# Patient Record
Sex: FEMALE | Race: BLACK OR AFRICAN AMERICAN | NOT HISPANIC OR LATINO | URBAN - METROPOLITAN AREA
[De-identification: names, ages, dates, MRNs, and addresses within clinical notes are randomized per-mention and may not be internally consistent; named-entity substitution may affect disease eponyms.]

---

## 2020-12-03 ENCOUNTER — INPATIENT (INPATIENT)
Facility: HOSPITAL | Age: 65
LOS: 6 days | Discharge: HOSPICE | End: 2020-12-10
Attending: INTERNAL MEDICINE | Admitting: INTERNAL MEDICINE
Payer: COMMERCIAL

## 2020-12-03 VITALS
DIASTOLIC BLOOD PRESSURE: 81 MMHG | HEART RATE: 143 BPM | TEMPERATURE: 98 F | SYSTOLIC BLOOD PRESSURE: 115 MMHG | OXYGEN SATURATION: 93 % | RESPIRATION RATE: 18 BRPM

## 2020-12-03 DIAGNOSIS — C79.51 SECONDARY MALIGNANT NEOPLASM OF BONE: ICD-10-CM

## 2020-12-03 DIAGNOSIS — Z66 DO NOT RESUSCITATE: ICD-10-CM

## 2020-12-03 DIAGNOSIS — E87.5 HYPERKALEMIA: ICD-10-CM

## 2020-12-03 DIAGNOSIS — C79.52 SECONDARY MALIGNANT NEOPLASM OF BONE MARROW: ICD-10-CM

## 2020-12-03 DIAGNOSIS — R10.9 UNSPECIFIED ABDOMINAL PAIN: ICD-10-CM

## 2020-12-03 DIAGNOSIS — I10 ESSENTIAL (PRIMARY) HYPERTENSION: ICD-10-CM

## 2020-12-03 DIAGNOSIS — Z88.0 ALLERGY STATUS TO PENICILLIN: ICD-10-CM

## 2020-12-03 DIAGNOSIS — Z87.891 PERSONAL HISTORY OF NICOTINE DEPENDENCE: ICD-10-CM

## 2020-12-03 DIAGNOSIS — J43.9 EMPHYSEMA, UNSPECIFIED: ICD-10-CM

## 2020-12-03 DIAGNOSIS — C34.12 MALIGNANT NEOPLASM OF UPPER LOBE, LEFT BRONCHUS OR LUNG: ICD-10-CM

## 2020-12-03 DIAGNOSIS — F40.00 AGORAPHOBIA, UNSPECIFIED: ICD-10-CM

## 2020-12-03 DIAGNOSIS — M84.58XA PATHOLOGICAL FRACTURE IN NEOPLASTIC DISEASE, OTHER SPECIFIED SITE, INITIAL ENCOUNTER FOR FRACTURE: ICD-10-CM

## 2020-12-03 DIAGNOSIS — C79.89 SECONDARY MALIGNANT NEOPLASM OF OTHER SPECIFIED SITES: ICD-10-CM

## 2020-12-03 DIAGNOSIS — F41.8 OTHER SPECIFIED ANXIETY DISORDERS: ICD-10-CM

## 2020-12-03 DIAGNOSIS — E43 UNSPECIFIED SEVERE PROTEIN-CALORIE MALNUTRITION: ICD-10-CM

## 2020-12-03 LAB
ALBUMIN SERPL ELPH-MCNC: 3.7 G/DL — SIGNIFICANT CHANGE UP (ref 3.5–5.2)
ALP SERPL-CCNC: 112 U/L — SIGNIFICANT CHANGE UP (ref 30–115)
ALT FLD-CCNC: 11 U/L — SIGNIFICANT CHANGE UP (ref 0–41)
ANION GAP SERPL CALC-SCNC: 16 MMOL/L — HIGH (ref 7–14)
AST SERPL-CCNC: 24 U/L — SIGNIFICANT CHANGE UP (ref 0–41)
BASE EXCESS BLDV CALC-SCNC: 5.7 MMOL/L — HIGH (ref -2–2)
BASOPHILS # BLD AUTO: 0.05 K/UL — SIGNIFICANT CHANGE UP (ref 0–0.2)
BASOPHILS NFR BLD AUTO: 0.6 % — SIGNIFICANT CHANGE UP (ref 0–1)
BILIRUB SERPL-MCNC: 0.2 MG/DL — SIGNIFICANT CHANGE UP (ref 0.2–1.2)
BUN SERPL-MCNC: 9 MG/DL — LOW (ref 10–20)
CA-I SERPL-SCNC: 1.15 MMOL/L — SIGNIFICANT CHANGE UP (ref 1.12–1.3)
CALCIUM SERPL-MCNC: 9 MG/DL — SIGNIFICANT CHANGE UP (ref 8.5–10.1)
CHLORIDE SERPL-SCNC: 94 MMOL/L — LOW (ref 98–110)
CO2 SERPL-SCNC: 25 MMOL/L — SIGNIFICANT CHANGE UP (ref 17–32)
CREAT SERPL-MCNC: 0.5 MG/DL — LOW (ref 0.7–1.5)
D DIMER BLD IA.RAPID-MCNC: 3633 NG/ML DDU — HIGH (ref 0–230)
EOSINOPHIL # BLD AUTO: 0.01 K/UL — SIGNIFICANT CHANGE UP (ref 0–0.7)
EOSINOPHIL NFR BLD AUTO: 0.1 % — SIGNIFICANT CHANGE UP (ref 0–8)
GAS PNL BLDV: 139 MMOL/L — SIGNIFICANT CHANGE UP (ref 136–145)
GAS PNL BLDV: SIGNIFICANT CHANGE UP
GLUCOSE SERPL-MCNC: 119 MG/DL — HIGH (ref 70–99)
HCO3 BLDV-SCNC: 33 MMOL/L — HIGH (ref 22–29)
HCT VFR BLD CALC: 36.5 % — LOW (ref 37–47)
HCT VFR BLDA CALC: 35.3 % — SIGNIFICANT CHANGE UP (ref 34–44)
HGB BLD CALC-MCNC: 11.5 G/DL — LOW (ref 14–18)
HGB BLD-MCNC: 11.3 G/DL — LOW (ref 12–16)
IMM GRANULOCYTES NFR BLD AUTO: 0.5 % — HIGH (ref 0.1–0.3)
LACTATE BLDV-MCNC: 2.5 MMOL/L — HIGH (ref 0.5–1.6)
LACTATE SERPL-SCNC: 2.5 MMOL/L — HIGH (ref 0.7–2)
LIDOCAIN IGE QN: 23 U/L — SIGNIFICANT CHANGE UP (ref 7–60)
LYMPHOCYTES # BLD AUTO: 1.61 K/UL — SIGNIFICANT CHANGE UP (ref 1.2–3.4)
LYMPHOCYTES # BLD AUTO: 18.5 % — LOW (ref 20.5–51.1)
MAGNESIUM SERPL-MCNC: 1.8 MG/DL — SIGNIFICANT CHANGE UP (ref 1.8–2.4)
MCHC RBC-ENTMCNC: 30.9 PG — SIGNIFICANT CHANGE UP (ref 27–31)
MCHC RBC-ENTMCNC: 31 G/DL — LOW (ref 32–37)
MCV RBC AUTO: 99.7 FL — HIGH (ref 81–99)
MONOCYTES # BLD AUTO: 0.92 K/UL — HIGH (ref 0.1–0.6)
MONOCYTES NFR BLD AUTO: 10.6 % — HIGH (ref 1.7–9.3)
NEUTROPHILS # BLD AUTO: 6.07 K/UL — SIGNIFICANT CHANGE UP (ref 1.4–6.5)
NEUTROPHILS NFR BLD AUTO: 69.7 % — SIGNIFICANT CHANGE UP (ref 42.2–75.2)
NRBC # BLD: 0 /100 WBCS — SIGNIFICANT CHANGE UP (ref 0–0)
NT-PROBNP SERPL-SCNC: 223 PG/ML — SIGNIFICANT CHANGE UP (ref 0–300)
PCO2 BLDV: 62 MMHG — HIGH (ref 41–51)
PH BLDV: 7.33 — SIGNIFICANT CHANGE UP (ref 7.26–7.43)
PHOSPHATE SERPL-MCNC: 3.3 MG/DL — SIGNIFICANT CHANGE UP (ref 2.1–4.9)
PLATELET # BLD AUTO: 463 K/UL — HIGH (ref 130–400)
PO2 BLDV: 40 MMHG — SIGNIFICANT CHANGE UP (ref 20–40)
POTASSIUM BLDV-SCNC: 3.7 MMOL/L — SIGNIFICANT CHANGE UP (ref 3.3–5.6)
POTASSIUM SERPL-MCNC: 4.7 MMOL/L — SIGNIFICANT CHANGE UP (ref 3.5–5)
POTASSIUM SERPL-SCNC: 4.7 MMOL/L — SIGNIFICANT CHANGE UP (ref 3.5–5)
PROT SERPL-MCNC: 7 G/DL — SIGNIFICANT CHANGE UP (ref 6–8)
RAPID RVP RESULT: SIGNIFICANT CHANGE UP
RBC # BLD: 3.66 M/UL — LOW (ref 4.2–5.4)
RBC # FLD: 14.3 % — SIGNIFICANT CHANGE UP (ref 11.5–14.5)
SAO2 % BLDV: 67 % — SIGNIFICANT CHANGE UP
SARS-COV-2 RNA SPEC QL NAA+PROBE: SIGNIFICANT CHANGE UP
SODIUM SERPL-SCNC: 135 MMOL/L — SIGNIFICANT CHANGE UP (ref 135–146)
TROPONIN T SERPL-MCNC: <0.01 NG/ML — SIGNIFICANT CHANGE UP
WBC # BLD: 8.7 K/UL — SIGNIFICANT CHANGE UP (ref 4.8–10.8)
WBC # FLD AUTO: 8.7 K/UL — SIGNIFICANT CHANGE UP (ref 4.8–10.8)

## 2020-12-03 PROCEDURE — 93010 ELECTROCARDIOGRAM REPORT: CPT

## 2020-12-03 PROCEDURE — 70450 CT HEAD/BRAIN W/O DYE: CPT | Mod: 26

## 2020-12-03 PROCEDURE — 99285 EMERGENCY DEPT VISIT HI MDM: CPT

## 2020-12-03 PROCEDURE — 71045 X-RAY EXAM CHEST 1 VIEW: CPT | Mod: 26

## 2020-12-03 PROCEDURE — 74177 CT ABD & PELVIS W/CONTRAST: CPT | Mod: 26

## 2020-12-03 PROCEDURE — 71275 CT ANGIOGRAPHY CHEST: CPT | Mod: 26

## 2020-12-03 RX ORDER — CEFTRIAXONE 500 MG/1
1000 INJECTION, POWDER, FOR SOLUTION INTRAMUSCULAR; INTRAVENOUS ONCE
Refills: 0 | Status: COMPLETED | OUTPATIENT
Start: 2020-12-03 | End: 2020-12-03

## 2020-12-03 RX ORDER — AZITHROMYCIN 500 MG/1
500 TABLET, FILM COATED ORAL ONCE
Refills: 0 | Status: COMPLETED | OUTPATIENT
Start: 2020-12-03 | End: 2020-12-03

## 2020-12-03 RX ORDER — SODIUM CHLORIDE 9 MG/ML
1000 INJECTION, SOLUTION INTRAVENOUS ONCE
Refills: 0 | Status: DISCONTINUED | OUTPATIENT
Start: 2020-12-03 | End: 2020-12-03

## 2020-12-03 RX ORDER — IPRATROPIUM/ALBUTEROL SULFATE 18-103MCG
3 AEROSOL WITH ADAPTER (GRAM) INHALATION
Refills: 0 | Status: COMPLETED | OUTPATIENT
Start: 2020-12-03 | End: 2020-12-03

## 2020-12-03 RX ORDER — MAGNESIUM SULFATE 500 MG/ML
2 VIAL (ML) INJECTION ONCE
Refills: 0 | Status: COMPLETED | OUTPATIENT
Start: 2020-12-03 | End: 2020-12-03

## 2020-12-03 RX ORDER — DEXAMETHASONE 0.5 MG/5ML
10 ELIXIR ORAL ONCE
Refills: 0 | Status: COMPLETED | OUTPATIENT
Start: 2020-12-03 | End: 2020-12-03

## 2020-12-03 RX ORDER — SODIUM CHLORIDE 9 MG/ML
1000 INJECTION INTRAMUSCULAR; INTRAVENOUS; SUBCUTANEOUS ONCE
Refills: 0 | Status: COMPLETED | OUTPATIENT
Start: 2020-12-03 | End: 2020-12-03

## 2020-12-03 RX ORDER — FOLIC ACID 0.8 MG
1 TABLET ORAL ONCE
Refills: 0 | Status: COMPLETED | OUTPATIENT
Start: 2020-12-03 | End: 2020-12-04

## 2020-12-03 RX ORDER — THIAMINE MONONITRATE (VIT B1) 100 MG
100 TABLET ORAL ONCE
Refills: 0 | Status: COMPLETED | OUTPATIENT
Start: 2020-12-03 | End: 2020-12-03

## 2020-12-03 RX ADMIN — Medication 3 MILLILITER(S): at 23:20

## 2020-12-03 RX ADMIN — Medication 50 GRAM(S): at 23:22

## 2020-12-03 RX ADMIN — Medication 3 MILLILITER(S): at 23:10

## 2020-12-03 RX ADMIN — Medication 3 MILLILITER(S): at 23:00

## 2020-12-03 RX ADMIN — CEFTRIAXONE 100 MILLIGRAM(S): 500 INJECTION, POWDER, FOR SOLUTION INTRAMUSCULAR; INTRAVENOUS at 23:22

## 2020-12-03 RX ADMIN — AZITHROMYCIN 255 MILLIGRAM(S): 500 TABLET, FILM COATED ORAL at 23:23

## 2020-12-03 RX ADMIN — SODIUM CHLORIDE 1000 MILLILITER(S): 9 INJECTION INTRAMUSCULAR; INTRAVENOUS; SUBCUTANEOUS at 23:22

## 2020-12-03 RX ADMIN — Medication 10 MILLIGRAM(S): at 23:18

## 2020-12-03 RX ADMIN — Medication 100 MILLIGRAM(S): at 23:18

## 2020-12-03 NOTE — ED PROVIDER NOTE - OBJECTIVE STATEMENT
65 y.o female w/ hx of asthma, COPD, anxiety presents to the ED for evaluation of abd pain x 3 months.  Periumbilical, intermittent, crampy, mild severity, no radiation of pain.  reports 30 lb weight loss over past 3 months.  Also reports THOMAS for past few weeks, worse today. Last BM yesterday. Denies diarrhea, constipation, vaginal d/c or bleeding, urinary sxs, fever, chills, back pain

## 2020-12-03 NOTE — ED PROVIDER NOTE - CARE PLAN
Principal Discharge DX:	Respiratory distress  Secondary Diagnosis:	Hypoxia  Secondary Diagnosis:	Liver mass   Principal Discharge DX:	Respiratory distress  Secondary Diagnosis:	Hypoxia  Secondary Diagnosis:	Liver mass  Secondary Diagnosis:	Pathologic fracture

## 2020-12-03 NOTE — ED PROVIDER NOTE - PHYSICAL EXAMINATION
CONST: chronically ill appearing.   EYES: PERRL, EOMI, Sclera and conjunctiva clear.  NECK: Non-tender, no meningeal signs, supple  CARD: tachycardic, regular.   RESP: spo2 75% on RA after exertion, spo2 98% on 8 L nonrebreather.   GI: Soft, diffusely tender, mildly distended.   MS: Normal ROM in all extremities. No edema of lower extremities, no calf pain, radial pulses 2+ bilaterally  SKIN: Warm, dry, no acute rashes. Good turgor  NEURO: A&Ox3, No focal deficits. Strength 5/5 with no sensory deficits. Steady gait

## 2020-12-03 NOTE — ED PROVIDER NOTE - NS ED ROS FT
Constitutional: See HPI.  Eyes: No visual changes, eye pain or discharge. No Photophobia  ENMT: No hearing changes, pain, discharge or infections.   Cardiac: No SOB or edema. No chest pain with exertion.  Respiratory: + dyspnea. No cough or respiratory distress. No hemoptysis.   GI: No nausea, vomiting, diarrhea or abdominal pain.  : No dysuria, frequency or burning. No Discharge  MS: No myalgia, muscle weakness, joint pain or back pain.  Neuro: No headache or weakness  Skin: No skin rash.  Except as documented in the HPI, all other systems are negative.

## 2020-12-03 NOTE — ED PROVIDER NOTE - ATTENDING CONTRIBUTION TO CARE
I personally evaluated the patient. I reviewed the Resident’s or Physician Assistant’s note (as assigned above), and agree with the findings and plan except as documented in my note.    65 y.o female w/ hx of asthma, COPD, anxiety presents to the ED for evaluation of abd pain x 3 months.  Reports weight loss and SOB.    CONSTITUTIONAL: Cachectic   SKIN: skin exam is warm and dry, no acute rash.  HEAD: Normocephalic; atraumatic.  EYES: PERRL, 3 mm bilateral, no nystagmus, EOM intact; conjunctiva and sclera clear.  ENT: No nasal discharge; airway clear.  NECK: Supple; non tender.+ full passive ROM in all directions. No JVD  CARD: S1, S2 normal; no murmurs, gallops, or rubs. Regular rate and rhythm. + Symmetric Strong Pulses  RESP: tachypneic, decreased breath sounds b/l   ABD: tender diffusely   EXT: Normal ROM. No clubbing, cyanosis or edema. Dp and Pt Pulses intact. Cap refill less than 3 seconds  NEURO: CN 2-12 intact, normal finger to nose, Strength intact b/l    A/p- CT chest/abd, labs, Supplemental O2, IVF, reassess

## 2020-12-03 NOTE — ED ADULT NURSE NOTE - NSIMPLEMENTINTERV_GEN_ALL_ED
Implemented All Fall Risk Interventions:  [place] to call system. Call bell, personal items and telephone within reach. Instruct patient to call for assistance. Room bathroom lighting operational. Non-slip footwear when patient is off stretcher. Physically safe environment: no spills, clutter or unnecessary equipment. Stretcher in lowest position, wheels locked, appropriate side rails in place. Provide visual cue, wrist band, yellow gown, etc. Monitor gait and stability. Monitor for mental status changes and reorient to person, place, and time. Review medications for side effects contributing to fall risk. Reinforce activity limits and safety measures with patient and family.

## 2020-12-03 NOTE — ED PROVIDER NOTE - PROGRESS NOTE DETAILS
ct reading noted.  nebs given w/ some improvement, hr improved w/ fluids, abx given for potential infectious source.  spo2 98% on 5L NC,  before 75% on RA, 80s on 5L NC.  still mildly tachypenic refusing bipap 2/2 anxiety/ claustrophobia.  discussed case w/ icu jamie jennings. recommends step down approved by marleny\ discussed case w/ NS.  aware of consult Discussed case w/ mar.  aware of admission. pt received 600 cc LR before switching to NS for abx compatibility.

## 2020-12-03 NOTE — ED ADULT TRIAGE NOTE - CHIEF COMPLAINT QUOTE
Pt presenting with abdominal pain x 3 months, pt also reports a 30lb weight loss since May, pt feels anxious in triage so ekg was done because of tachycardia

## 2020-12-03 NOTE — CONSULT NOTE ADULT - SUBJECTIVE AND OBJECTIVE BOX
HISTORY OF PRESENT ILLNESS: 65y Female hx of asthma, COPD, anxiety presents to the ED for evaluation of intermittent, crampy periumbilical abdominal pain x 3 months that does not radiate as well as a 30 lb weight loss over the past 3 months.  The patoient also admits to THOMAS for past few weeks, worsening constipation x 2 months, difficulty ambulating due to weakness and she fell out of her bed 3 weeks ago. Pt denies groin / saddle anesthesia, diarrhea,  vaginal d/c or bleeding,  fever, chills, or paresthesias or numbness in her extremities. Patient admits to "a few" episodes of urinary incontinence but states that it was because she could not make it to the bathroom in time but always feels the urge if she has to go. While in the ED the patient reports she was able to ambulate out of her bed to the bathroom but felt weak and short of breath.     PAST MEDICAL & SURGICAL HISTORY:  Agoraphobia    Anxiety    Emphysema lung    FAMILY HISTORY:    Allergies    penicillins (Unknown)    Intolerances    REVIEW OF SYSTEMS : All systems negative other than those listed in HPI  General:	-  Skin/Breast: -  Ophthalmologic: -   ENMT: -  Respiratory and Thorax: -  Cardiovascular: -	  Gastrointestinal: -  Genitourinary:-  Musculoskeletal:	-  Neurological:	-  Psychiatric: -  Hematology/Lymphatics:	-  Endocrine:-  Allergic/Immunologic:	-    Vital Signs Last 24 Hrs  T(C): 36.9 (03 Dec 2020 17:26), Max: 36.9 (03 Dec 2020 17:26)  T(F): 98.4 (03 Dec 2020 17:26), Max: 98.4 (03 Dec 2020 17:26)  HR: 129 (03 Dec 2020 22:10) (98 - 143)  BP: 122/69 (03 Dec 2020 22:10) (95/64 - 122/69)  BP(mean): --  RR: 18 (03 Dec 2020 22:14) (16 - 20)  SpO2: 94% (03 Dec 2020 22:14) (79% - 97%)    Physical Exam :  General : Patient appears malnourished with generalized weakness throughout   A&O x 3   Tongue midline  Facial features symmetric, No droop  Speech clear and appropriate, no slur   Pt speaking in full sentences   Follows all commands   Occular :   PERRLA, EOMI  Motor :   MAEx4 b/l  Generalized weakness throughout   5/5 in b/u UE good hand , 5/5 biceps & delts   No spinal point tenderness to palpation   4-/5 b/l LE leg raise   5/5 dorsiflexion & plantarflexion bilaterally   Bends knees b/l producing hip pain per pt   Sensory :  Intact bilaterally to light touch in upper and lower extrem     Hoffmans : negative b/l     LABS:                        11.3   8.70  )-----------( 463      ( 03 Dec 2020 18:56 )             36.5     12-03    135  |  94<L>  |  9<L>  ----------------------------<  119<H>  4.7   |  25  |  0.5<L>    Ca    9.0      03 Dec 2020 18:56  Phos  3.3     12-03  Mg     1.8     12-03    TPro  7.0  /  Alb  3.7  /  TBili  0.2  /  DBili  x   /  AST  24  /  ALT  11  /  AlkPhos  112  12-03    RADIOLOGY & ADDITIONAL STUDIES:  < from: CT Abdomen and Pelvis w/ IV Cont (12.03.20 @ 21:27) >    IMPRESSION:    Multiple left upper lobe pulmonary masses with conglomerate mediastinal/hilar lymphadenopathy and numerous hepatic lesions. Findings concerning for metastatic malignancy.    Radiolucent lesion in the inferior endplate of L2 is indeterminate.    No CT evidence of acute pulmonary embolus.    Additional Findings/Recommendations After Attending Radiologist Review:  Agree no evidence of pulmonary embolism. Multiple left upper lobe lesions suspicious for malignancy. Also scattered throughout the left lung are micronodules which could represent superimposed infection or lymphangitic spread. There are multiple findings of bony metastatic disease. Left iliac bone lytic lesion with a 6.3 cm soft tissue component (series 8, image 187). This process anteriorly displaces the retroperitoneum and left kidney. Acute pathologic compression fracture deformity of the left aspect of L5 vertebral body, with mild endplate depression. Metastatic lytic lesion in L2 vertebral body, also the right L4 posterior elements. Indeterminate 1.8 cm hyperenhancing nodule in the pelvis, may be associated with the mesentery (series 8, image 270). Possible periportal lymphadenopathy. Please note the overnight ER examination is setting is not tailored for detailed oncologic evaluation/staging or full characterization of metastatic disease.    Spoke with ROBERTA WEAVER PA on 12/3/2020 10:36 PM with readback.    SUZANNA IBARRA M.D., RESIDENT RADIOLOGIST  This document has been electronically signed.  MEGHAN HERNANDEZ MD; Attending Radiologist  This document has been electronically signed. Dec  3 2020 10:37PM    < end of copied text >    < from: CT Head No Cont (12.03.20 @ 21:40) >  Impression:    1.3 cm lytic lesion within the posterior left parietal bone suspicious for metastatic disease.    No acute intracranial hemorrhage, significant displacing mass effect, or midline shift is noted. Please note that parenchymal metastatic disease cannot be excluded on noncontrast head CT and if there is persistent clinical concern a postcontrast MRI should be obtained.    MEGHAN HERNANDEZ MD; Attending Radiologist  This document has been electronically signed. Dec  3 2020  9:57PM    < end of copied text >    Assessment / Plan : 65y F presents to ED with 3m of abdominal pain infrequent bowl movements and 30lbs weight loss x3m. CT abd shows multiple pulmonary lesions suspicious for malignancy. Multiple findings of bony metastatic disease including the Left iliac bone with a 6.3 cm soft tissue component that displaces the retroperitoneum and left kidney. Acute pathologic compression fracture of the  left L5 vertebral body, with mild endplate depression. Metastatic lytic lesion in L2 vertebral body, also the right L4 posterior elements. Indeterminate 1.8 cm hyperenhancing nodule in the pelvis, may be associated with the mesentery.   - on exam pt MAEx4, has generalized weakness throughout, no tenderness to palpation of the spine, denies saddle anesthesia, no paresthesias to the extremities and reports that she feels the urge to urinate and move her bowels but has experienced "accidents" because she is too weak to ambulate.   - Heme / Onc work up of primary CA  - MRI w/ w/o of lumbar spine / pelvis when able   - Will discuss with attending, no acute neurosurgical intervention at this time      HISTORY OF PRESENT ILLNESS: 65y Female hx of asthma, COPD, anxiety presents to the ED for evaluation of intermittent, crampy periumbilical abdominal pain x 3 months that does not radiate as well as a 30 lb weight loss over the past 3 months.  The patoient also admits to THOMAS for past few weeks, worsening constipation x 2 months, difficulty ambulating due to weakness and she fell out of her bed 3 weeks ago. Pt denies groin / saddle anesthesia, diarrhea,  vaginal d/c or bleeding,  fever, chills, or paresthesias or numbness in her extremities. Patient admits to "a few" episodes of urinary incontinence but states that it was because she could not make it to the bathroom in time but always feels the urge if she has to go. While in the ED the patient reports she was able to ambulate out of her bed to the bathroom but felt weak and short of breath.     PAST MEDICAL & SURGICAL HISTORY:  Agoraphobia    Anxiety    Emphysema lung    FAMILY HISTORY:    Allergies    penicillins (Unknown)    Intolerances    REVIEW OF SYSTEMS : All systems negative other than those listed in HPI  General:	-  Skin/Breast: -  Ophthalmologic: -   ENMT: -  Respiratory and Thorax: -  Cardiovascular: -	  Gastrointestinal: -  Genitourinary:-  Musculoskeletal:	-  Neurological:	-  Psychiatric: -  Hematology/Lymphatics:	-  Endocrine:-  Allergic/Immunologic:	-    Vital Signs Last 24 Hrs  T(C): 36.9 (03 Dec 2020 17:26), Max: 36.9 (03 Dec 2020 17:26)  T(F): 98.4 (03 Dec 2020 17:26), Max: 98.4 (03 Dec 2020 17:26)  HR: 129 (03 Dec 2020 22:10) (98 - 143)  BP: 122/69 (03 Dec 2020 22:10) (95/64 - 122/69)  BP(mean): --  RR: 18 (03 Dec 2020 22:14) (16 - 20)  SpO2: 94% (03 Dec 2020 22:14) (79% - 97%)    Physical Exam :  General : Patient appears malnourished with generalized weakness throughout   A&O x 3   Tongue midline  Facial features symmetric, No droop  Speech clear and appropriate, no slur   Pt speaking in full sentences   Follows all commands   Occular :   PERRLA, EOMI  Motor :   MAEx4 b/l  Generalized weakness throughout   5/5 in b/u UE good hand , 5/5 biceps & delts   No spinal point tenderness to palpation   4-/5 b/l LE leg raise   5/5 dorsiflexion & plantarflexion bilaterally   Bends knees b/l producing hip pain per pt   Sensory :  Intact bilaterally to light touch in upper and lower extrem     Hoffmans : negative b/l     LABS:                        11.3   8.70  )-----------( 463      ( 03 Dec 2020 18:56 )             36.5     12-03    135  |  94<L>  |  9<L>  ----------------------------<  119<H>  4.7   |  25  |  0.5<L>    Ca    9.0      03 Dec 2020 18:56  Phos  3.3     12-03  Mg     1.8     12-03    TPro  7.0  /  Alb  3.7  /  TBili  0.2  /  DBili  x   /  AST  24  /  ALT  11  /  AlkPhos  112  12-03    RADIOLOGY & ADDITIONAL STUDIES:  < from: CT Abdomen and Pelvis w/ IV Cont (12.03.20 @ 21:27) >    IMPRESSION:    Multiple left upper lobe pulmonary masses with conglomerate mediastinal/hilar lymphadenopathy and numerous hepatic lesions. Findings concerning for metastatic malignancy.    Radiolucent lesion in the inferior endplate of L2 is indeterminate.    No CT evidence of acute pulmonary embolus.    Additional Findings/Recommendations After Attending Radiologist Review:  Agree no evidence of pulmonary embolism. Multiple left upper lobe lesions suspicious for malignancy. Also scattered throughout the left lung are micronodules which could represent superimposed infection or lymphangitic spread. There are multiple findings of bony metastatic disease. Left iliac bone lytic lesion with a 6.3 cm soft tissue component (series 8, image 187). This process anteriorly displaces the retroperitoneum and left kidney. Acute pathologic compression fracture deformity of the left aspect of L5 vertebral body, with mild endplate depression. Metastatic lytic lesion in L2 vertebral body, also the right L4 posterior elements. Indeterminate 1.8 cm hyperenhancing nodule in the pelvis, may be associated with the mesentery (series 8, image 270). Possible periportal lymphadenopathy. Please note the overnight ER examination is setting is not tailored for detailed oncologic evaluation/staging or full characterization of metastatic disease.    Spoke with ROBERTA WEAVER PA on 12/3/2020 10:36 PM with readback.    SUZANNA IBARRA M.D., RESIDENT RADIOLOGIST  This document has been electronically signed.  MEGHAN HERNANDEZ MD; Attending Radiologist  This document has been electronically signed. Dec  3 2020 10:37PM    < end of copied text >    < from: CT Head No Cont (12.03.20 @ 21:40) >  Impression:    1.3 cm lytic lesion within the posterior left parietal bone suspicious for metastatic disease.    No acute intracranial hemorrhage, significant displacing mass effect, or midline shift is noted. Please note that parenchymal metastatic disease cannot be excluded on noncontrast head CT and if there is persistent clinical concern a postcontrast MRI should be obtained.    MEGHAN HERNANDEZ MD; Attending Radiologist  This document has been electronically signed. Dec  3 2020  9:57PM    < end of copied text >    Assessment / Plan : 65y F presents to ED with 3m of abdominal pain infrequent bowl movements and 30lbs weight loss x3m. CT abd shows multiple pulmonary lesions suspicious for malignancy. Multiple findings of bony metastatic disease including the Left iliac bone with a 6.3 cm soft tissue component that displaces the retroperitoneum and left kidney. Acute pathologic compression fracture of the  left L5 vertebral body, with mild endplate depression. Metastatic lytic lesion in L2 vertebral body, also the right L4 posterior elements. Indeterminate 1.8 cm hyperenhancing nodule in the pelvis, may be associated with the mesentery.   - on exam pt MAEx4, has generalized weakness throughout, no tenderness to palpation of the spine, denies saddle anesthesia, no paresthesias to the extremities and reports that she feels the urge to urinate and move her bowels but has experienced "accidents" because she is too weak to ambulate.   - Heme / Onc work up of primary CA  - MRI w/ w/o of lumbar spine / pelvis when able   - Will discuss with attending, no urgent acute neurosurgical intervention at this time

## 2020-12-04 DIAGNOSIS — F40.01 AGORAPHOBIA WITH PANIC DISORDER: ICD-10-CM

## 2020-12-04 LAB
ALBUMIN SERPL ELPH-MCNC: 3.7 G/DL — SIGNIFICANT CHANGE UP (ref 3.5–5.2)
ALP SERPL-CCNC: 112 U/L — SIGNIFICANT CHANGE UP (ref 30–115)
ALT FLD-CCNC: 11 U/L — SIGNIFICANT CHANGE UP (ref 0–41)
ANION GAP SERPL CALC-SCNC: 12 MMOL/L — SIGNIFICANT CHANGE UP (ref 7–14)
APPEARANCE UR: CLEAR — SIGNIFICANT CHANGE UP
AST SERPL-CCNC: 25 U/L — SIGNIFICANT CHANGE UP (ref 0–41)
BASOPHILS # BLD AUTO: 0.01 K/UL — SIGNIFICANT CHANGE UP (ref 0–0.2)
BASOPHILS NFR BLD AUTO: 0.2 % — SIGNIFICANT CHANGE UP (ref 0–1)
BILIRUB SERPL-MCNC: <0.2 MG/DL — SIGNIFICANT CHANGE UP (ref 0.2–1.2)
BILIRUB UR-MCNC: NEGATIVE — SIGNIFICANT CHANGE UP
BUN SERPL-MCNC: 7 MG/DL — LOW (ref 10–20)
CALCIUM SERPL-MCNC: 9.2 MG/DL — SIGNIFICANT CHANGE UP (ref 8.5–10.1)
CHLORIDE SERPL-SCNC: 98 MMOL/L — SIGNIFICANT CHANGE UP (ref 98–110)
CHOLEST SERPL-MCNC: 239 MG/DL — HIGH
CO2 SERPL-SCNC: 27 MMOL/L — SIGNIFICANT CHANGE UP (ref 17–32)
COLOR SPEC: SIGNIFICANT CHANGE UP
CREAT SERPL-MCNC: 0.5 MG/DL — LOW (ref 0.7–1.5)
CRP SERPL-MCNC: 1.3 MG/DL — HIGH (ref 0–0.4)
DIFF PNL FLD: NEGATIVE — SIGNIFICANT CHANGE UP
EOSINOPHIL # BLD AUTO: 0 K/UL — SIGNIFICANT CHANGE UP (ref 0–0.7)
EOSINOPHIL NFR BLD AUTO: 0 % — SIGNIFICANT CHANGE UP (ref 0–8)
FERRITIN SERPL-MCNC: 264 NG/ML — HIGH (ref 15–150)
FOLATE SERPL-MCNC: >20 NG/ML — SIGNIFICANT CHANGE UP
GLUCOSE SERPL-MCNC: 163 MG/DL — HIGH (ref 70–99)
GLUCOSE UR QL: NEGATIVE — SIGNIFICANT CHANGE UP
HCT VFR BLD CALC: 33.8 % — LOW (ref 37–47)
HCV AB S/CO SERPL IA: 0.03 COI — SIGNIFICANT CHANGE UP
HCV AB SERPL-IMP: SIGNIFICANT CHANGE UP
HDLC SERPL-MCNC: 83 MG/DL — SIGNIFICANT CHANGE UP
HGB BLD-MCNC: 10.3 G/DL — LOW (ref 12–16)
IMM GRANULOCYTES NFR BLD AUTO: 0.4 % — HIGH (ref 0.1–0.3)
KETONES UR-MCNC: SIGNIFICANT CHANGE UP
LEUKOCYTE ESTERASE UR-ACNC: NEGATIVE — SIGNIFICANT CHANGE UP
LIPID PNL WITH DIRECT LDL SERPL: 138 MG/DL — HIGH
LYMPHOCYTES # BLD AUTO: 0.31 K/UL — LOW (ref 1.2–3.4)
LYMPHOCYTES # BLD AUTO: 5.6 % — LOW (ref 20.5–51.1)
MAGNESIUM SERPL-MCNC: 2.2 MG/DL — SIGNIFICANT CHANGE UP (ref 1.8–2.4)
MCHC RBC-ENTMCNC: 30.5 G/DL — LOW (ref 32–37)
MCHC RBC-ENTMCNC: 31.5 PG — HIGH (ref 27–31)
MCV RBC AUTO: 103.4 FL — HIGH (ref 81–99)
MONOCYTES # BLD AUTO: 0.12 K/UL — SIGNIFICANT CHANGE UP (ref 0.1–0.6)
MONOCYTES NFR BLD AUTO: 2.2 % — SIGNIFICANT CHANGE UP (ref 1.7–9.3)
NEUTROPHILS # BLD AUTO: 5.07 K/UL — SIGNIFICANT CHANGE UP (ref 1.4–6.5)
NEUTROPHILS NFR BLD AUTO: 91.6 % — HIGH (ref 42.2–75.2)
NITRITE UR-MCNC: NEGATIVE — SIGNIFICANT CHANGE UP
NON HDL CHOLESTEROL: 156 MG/DL — HIGH
NRBC # BLD: 0 /100 WBCS — SIGNIFICANT CHANGE UP (ref 0–0)
PH UR: 6 — SIGNIFICANT CHANGE UP (ref 5–8)
PLATELET # BLD AUTO: 370 K/UL — SIGNIFICANT CHANGE UP (ref 130–400)
POTASSIUM SERPL-MCNC: 3.9 MMOL/L — SIGNIFICANT CHANGE UP (ref 3.5–5)
POTASSIUM SERPL-SCNC: 3.9 MMOL/L — SIGNIFICANT CHANGE UP (ref 3.5–5)
PROT SERPL-MCNC: 6.5 G/DL — SIGNIFICANT CHANGE UP (ref 6–8)
PROT UR-MCNC: SIGNIFICANT CHANGE UP
RBC # BLD: 3.27 M/UL — LOW (ref 4.2–5.4)
RBC # FLD: 14.4 % — SIGNIFICANT CHANGE UP (ref 11.5–14.5)
SODIUM SERPL-SCNC: 137 MMOL/L — SIGNIFICANT CHANGE UP (ref 135–146)
SP GR SPEC: >1.05 (ref 1.01–1.03)
TRIGL SERPL-MCNC: 86 MG/DL — SIGNIFICANT CHANGE UP
TSH SERPL-MCNC: 0.97 UIU/ML — SIGNIFICANT CHANGE UP (ref 0.27–4.2)
UROBILINOGEN FLD QL: SIGNIFICANT CHANGE UP
VIT B12 SERPL-MCNC: 1965 PG/ML — HIGH (ref 232–1245)
WBC # BLD: 5.53 K/UL — SIGNIFICANT CHANGE UP (ref 4.8–10.8)
WBC # FLD AUTO: 5.53 K/UL — SIGNIFICANT CHANGE UP (ref 4.8–10.8)

## 2020-12-04 PROCEDURE — 99253 IP/OBS CNSLTJ NEW/EST LOW 45: CPT | Mod: GC

## 2020-12-04 PROCEDURE — 99221 1ST HOSP IP/OBS SF/LOW 40: CPT

## 2020-12-04 PROCEDURE — 93306 TTE W/DOPPLER COMPLETE: CPT | Mod: 26

## 2020-12-04 RX ORDER — CHLORHEXIDINE GLUCONATE 213 G/1000ML
1 SOLUTION TOPICAL
Refills: 0 | Status: DISCONTINUED | OUTPATIENT
Start: 2020-12-04 | End: 2020-12-10

## 2020-12-04 RX ORDER — ALPRAZOLAM 0.25 MG
0.25 TABLET ORAL EVERY 6 HOURS
Refills: 0 | Status: DISCONTINUED | OUTPATIENT
Start: 2020-12-04 | End: 2020-12-10

## 2020-12-04 RX ORDER — MORPHINE SULFATE 50 MG/1
2 CAPSULE, EXTENDED RELEASE ORAL EVERY 4 HOURS
Refills: 0 | Status: DISCONTINUED | OUTPATIENT
Start: 2020-12-04 | End: 2020-12-07

## 2020-12-04 RX ORDER — DEXAMETHASONE 0.5 MG/5ML
4 ELIXIR ORAL EVERY 12 HOURS
Refills: 0 | Status: DISCONTINUED | OUTPATIENT
Start: 2020-12-04 | End: 2020-12-09

## 2020-12-04 RX ORDER — ENOXAPARIN SODIUM 100 MG/ML
40 INJECTION SUBCUTANEOUS DAILY
Refills: 0 | Status: DISCONTINUED | OUTPATIENT
Start: 2020-12-04 | End: 2020-12-10

## 2020-12-04 RX ORDER — ALPRAZOLAM 0.25 MG
0.25 TABLET ORAL EVERY 12 HOURS
Refills: 0 | Status: DISCONTINUED | OUTPATIENT
Start: 2020-12-04 | End: 2020-12-10

## 2020-12-04 RX ORDER — SERTRALINE 25 MG/1
125 TABLET, FILM COATED ORAL DAILY
Refills: 0 | Status: DISCONTINUED | OUTPATIENT
Start: 2020-12-04 | End: 2020-12-10

## 2020-12-04 RX ORDER — ALPRAZOLAM 0.25 MG
0.25 TABLET ORAL THREE TIMES A DAY
Refills: 0 | Status: DISCONTINUED | OUTPATIENT
Start: 2020-12-04 | End: 2020-12-04

## 2020-12-04 RX ORDER — METOPROLOL TARTRATE 50 MG
12.5 TABLET ORAL
Refills: 0 | Status: DISCONTINUED | OUTPATIENT
Start: 2020-12-04 | End: 2020-12-10

## 2020-12-04 RX ORDER — INFLUENZA VIRUS VACCINE 15; 15; 15; 15 UG/.5ML; UG/.5ML; UG/.5ML; UG/.5ML
0.5 SUSPENSION INTRAMUSCULAR ONCE
Refills: 0 | Status: DISCONTINUED | OUTPATIENT
Start: 2020-12-04 | End: 2020-12-10

## 2020-12-04 RX ORDER — SERTRALINE 25 MG/1
100 TABLET, FILM COATED ORAL DAILY
Refills: 0 | Status: DISCONTINUED | OUTPATIENT
Start: 2020-12-04 | End: 2020-12-04

## 2020-12-04 RX ORDER — AZITHROMYCIN 500 MG/1
500 TABLET, FILM COATED ORAL EVERY 24 HOURS
Refills: 0 | Status: DISCONTINUED | OUTPATIENT
Start: 2020-12-05 | End: 2020-12-08

## 2020-12-04 RX ORDER — CEFTRIAXONE 500 MG/1
1000 INJECTION, POWDER, FOR SOLUTION INTRAMUSCULAR; INTRAVENOUS EVERY 24 HOURS
Refills: 0 | Status: DISCONTINUED | OUTPATIENT
Start: 2020-12-04 | End: 2020-12-08

## 2020-12-04 RX ORDER — FLUTICASONE PROPIONATE AND SALMETEROL 50; 250 UG/1; UG/1
1 POWDER ORAL; RESPIRATORY (INHALATION)
Qty: 0 | Refills: 0 | DISCHARGE

## 2020-12-04 RX ORDER — AZITHROMYCIN 500 MG/1
500 TABLET, FILM COATED ORAL ONCE
Refills: 0 | Status: COMPLETED | OUTPATIENT
Start: 2020-12-04 | End: 2020-12-04

## 2020-12-04 RX ORDER — AZITHROMYCIN 500 MG/1
TABLET, FILM COATED ORAL
Refills: 0 | Status: DISCONTINUED | OUTPATIENT
Start: 2020-12-04 | End: 2020-12-08

## 2020-12-04 RX ORDER — BUDESONIDE AND FORMOTEROL FUMARATE DIHYDRATE 160; 4.5 UG/1; UG/1
2 AEROSOL RESPIRATORY (INHALATION)
Refills: 0 | Status: DISCONTINUED | OUTPATIENT
Start: 2020-12-04 | End: 2020-12-10

## 2020-12-04 RX ADMIN — SERTRALINE 100 MILLIGRAM(S): 25 TABLET, FILM COATED ORAL at 11:50

## 2020-12-04 RX ADMIN — Medication 12.5 MILLIGRAM(S): at 17:42

## 2020-12-04 RX ADMIN — Medication 4 MILLIGRAM(S): at 17:42

## 2020-12-04 RX ADMIN — Medication 1 MILLIGRAM(S): at 05:10

## 2020-12-04 RX ADMIN — AZITHROMYCIN 255 MILLIGRAM(S): 500 TABLET, FILM COATED ORAL at 09:20

## 2020-12-04 RX ADMIN — CHLORHEXIDINE GLUCONATE 1 APPLICATION(S): 213 SOLUTION TOPICAL at 05:09

## 2020-12-04 RX ADMIN — Medication 10 MILLIGRAM(S): at 05:09

## 2020-12-04 RX ADMIN — CEFTRIAXONE 100 MILLIGRAM(S): 500 INJECTION, POWDER, FOR SOLUTION INTRAMUSCULAR; INTRAVENOUS at 15:03

## 2020-12-04 RX ADMIN — MORPHINE SULFATE 2 MILLIGRAM(S): 50 CAPSULE, EXTENDED RELEASE ORAL at 22:57

## 2020-12-04 RX ADMIN — Medication 0.25 MILLIGRAM(S): at 19:45

## 2020-12-04 RX ADMIN — Medication 0.25 MILLIGRAM(S): at 14:35

## 2020-12-04 RX ADMIN — MORPHINE SULFATE 2 MILLIGRAM(S): 50 CAPSULE, EXTENDED RELEASE ORAL at 22:59

## 2020-12-04 RX ADMIN — BUDESONIDE AND FORMOTEROL FUMARATE DIHYDRATE 2 PUFF(S): 160; 4.5 AEROSOL RESPIRATORY (INHALATION) at 11:50

## 2020-12-04 RX ADMIN — ENOXAPARIN SODIUM 40 MILLIGRAM(S): 100 INJECTION SUBCUTANEOUS at 11:50

## 2020-12-04 RX ADMIN — Medication 0.25 MILLIGRAM(S): at 05:08

## 2020-12-04 NOTE — H&P ADULT - HISTORY OF PRESENT ILLNESS
64 y/o female with PMH Asthma, emphysema (not on home O2), anxiety/depression presents to [facility] with worsening abdominal pain. Patient states her pain first began about 3 months ago. It initially presented itself as a sharp intermittent pain behind the navel, but over time grew to be constant with radiation to her hips/back. During this time she has also become very weak, developed dyspnea on exertion, and lost about 30lbs unintentionally. Patient denies any fevers, chills, nausea, vomiting, palpitations, or numbness/tingling. She does endorse constipation. All other ROS negative.     In the ED, vitals were /81, , RR 18, T 98.4, SPO2 93% on RA. CT Angio negative for PE, but showed multiple left upper lobe lesions suspicious for malignancy. Also evidence of liver and bone metastatic disease. Admitted to medicine for further management.

## 2020-12-04 NOTE — CHART NOTE - COMMENTS:
Severe Protein-Calorie Malnutrition Indicators: 1) Severe muscle loss in the temporal/clavicle regions and subcutaneous fat loss in the buccal regions and fat overlying ribcage 2) <75% po intake >1 month    Underweight Indicator: BMI: 15.4 (64"/90#)

## 2020-12-04 NOTE — DIETITIAN INITIAL EVALUATION ADULT. - REASON FOR ADMISSION
Pt p/w worsening abdominal pain. CT Angio negative for PE, but showed multiple left upper lobe lesions suspicious for malignancy. Also evidence of liver and bone metastatic disease. Admitted to medicine for further management.

## 2020-12-04 NOTE — BEHAVIORAL HEALTH ASSESSMENT NOTE - HPI (INCLUDE ILLNESS QUALITY, SEVERITY, DURATION, TIMING, CONTEXT, MODIFYING FACTORS, ASSOCIATED SIGNS AND SYMPTOMS)
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 Psychiatry consulted for management of anxiety.       Upon evaluation, pt calm, cooperative, w/ bright affect, smiling throughout interview. States she's feeling relaxed at the moment as they had just given her xanax. States that last night was "worst night of her life" reporting that she had 6 panic attack since she's been in the hospital. States that she's suffered from anxiety and panic attack w/ agoraphobia and claustrophobia since 2013. States that the anxiety has become so severe that she doesn't leave her home. She also reports having fear of going to the doctors office and a fear of "needles." States that her health had significantly declined over the past year or two, stating that it has become so severe that she was no longer able to ambulate. Also reports having severe abdominal pain. States that her friend whom lives in  brought her to the hospital, due to concerns of the above. States that she knew "something was wrong" but is "glad I know what it is now." States that she is feeling better now that she is no longer in pain. In regards to her panic attacks, describes that as lasting 5-10 min, states they are triggered when she lays down and feels that she can not breath, states that she becomes hot, short of breath, and that she feels an impending sense of doom or death. States that the xanax helps, however is unsure if the Buspar does anything as she still gets attacks. States that her psychiatrist recently told her to increase the dose of her zoloft to 150mg. She otherwise denies SI, intent or plan. No AVH endorsed on exam. No manic or psychotic symptoms elicited on exam.       As per [facility] Reference #: [id number] patient currently prescribed xanax 0.25mg po TID by psychaitrist Dr. Amaya Davidson

## 2020-12-04 NOTE — BEHAVIORAL HEALTH ASSESSMENT NOTE - CASE SUMMARY
65 year old  Female with asthma, COPD, psychiatric history of anxiety and depression  in treatment on alprazolam  home dose 0.25mg po two times a day, sertraline 100m mg po daily and Buspar 10mg po bid, who presented to ED with severe constipation, weakness with ambulation, worsening abdominal pain and 30 lbs  wt loss, admitted for worsening abdominal pain found to have multiple metastatic masses in liver and bone of unknown primary origin.  Baseline underlying anxiety  with exacerbating symptoms in the context of medical conditions. At the time of the evaluation, notable mild improvement from reported exacerbation patient experienced in AM. She will strongly benefit from dose  titration  of xanax despite concern of delirium. Patient is severely anxious with multiple episodes of panic attack, while Klonopin appears to be a much more appropriate medication, but given the severity of the anxiety and the positive response to Xanax, changing it to Klonopin would not be appropriate at this time. Plan is to increase Xanax 0.25mg po three times a day to 0.25mg po four  times day (with prn doses of xanax 0.25mg po two times a day). Will also increase sertraline 100mg po daily to 125mg po daily. Unlikely patient is experiencing therapeutic effect from Bus par, therefore will Discontinue it. 65 year old  Female with asthma, COPD, psychiatric history of anxiety and depression  in treatment on alprazolam  home dose 0.25mg po two times a day, sertraline 100m mg po daily and Buspar 10mg po bid, who presented to ED with severe constipation, weakness with ambulation, worsening abdominal pain and 30 lbs  wt loss, admitted for worsening abdominal pain found to have multiple metastatic masses in liver and bone of unknown primary origin.  Baseline underlying anxiety  with exacerbating symptoms in the context of medical conditions. At the time of the evaluation, notable mild improvement from reported exacerbation patient experienced in AM. She will strongly benefit from dose  titration  of xanax despite concern of delirium. Patient is severely anxious with multiple episodes of panic attack, while Klonopin appears to be a much more appropriate medication, but given the severity of the anxiety and the positive response to Xanax, changing it to Klonopin would not be appropriate at this time. Plan is to increase Xanax 0.25mg po three times a day to 0.25mg po four  times day (with prn doses of xanax 0.25mg po two times a day). Will also increase sertraline 100mg po daily to 125mg po daily. Unlikely patient is experiencing therapeutic effect from Bus par, therefore will Discontinue it. She remains free of thought of self-harm.

## 2020-12-04 NOTE — DIETITIAN INITIAL EVALUATION ADULT. - ORAL INTAKE PTA/DIET HISTORY
Pt with decreased po intake x3 months 2/2 worsening abdominal pain and heartburn/gas. States that even when she didn't drink or eat, she would start having gas pain and feel as if something was stuck in her throat. Pt never had decreased appetite, but was just scared to eat d/t gas and abdominal pain. No cultural/Druze restrictions and NKFA. UBW ~74# in Jan 2020, now states that she's <50#, however ?accuracy. Upon observation pt seems to be >50#. Unable to verify accuracy of wt loss.

## 2020-12-04 NOTE — CONSULT NOTE ADULT - SUBJECTIVE AND OBJECTIVE BOX
Patient is a 65y old  Female who presents with a chief complaint of abdominal pain (04 Dec 2020 00:06)      HPI:  66 y/o female with PMH Asthma, emphysema (not on home O2), anxiety/depression presents to [facility] with worsening abdominal pain. Patient states her pain first began about 3 months ago. It initially presented itself as a sharp intermittent pain behind the navel, but over time grew to be constant with radiation to her hips/back. During this time she has also become very weak, developed dyspnea on exertion, and lost about 30lbs unintentionally. Patient denies any fevers, chills, nausea, vomiting, palpitations, or numbness/tingling. She does endorse constipation. All other ROS negative.     In the ED, vitals were /81, , RR 18, T 98.4, SPO2 93% on RA. CT Angio negative for PE, but showed multiple left upper lobe lesions suspicious for malignancy. Also evidence of liver and bone metastatic disease. Admitted to medicine for further management.  (04 Dec 2020 00:06)      PAST MEDICAL & SURGICAL HISTORY:  Depression    Agoraphobia    Anxiety    Emphysema lung    No significant past surgical history        SOCIAL HX:   Smoking   Positive                       ETOH                            Other    FAMILY HISTORY:  FH: stroke    FH: lung cancer  Mother; age 49    :  No known cardiovacular family hisotry     Review Of Systems:     All ROS are negative except per HPI       Allergies    penicillins (Unknown)    Intolerances          PHYSICAL EXAM    ICU Vital Signs Last 24 Hrs  T(C): 36.5 (04 Dec 2020 02:50), Max: 36.9 (03 Dec 2020 17:26)  T(F): 97.7 (04 Dec 2020 02:50), Max: 98.4 (03 Dec 2020 17:26)  HR: 119 (04 Dec 2020 02:50) (98 - 143)  BP: 100/60 (04 Dec 2020 02:50) (95/64 - 122/87)  BP(mean): --  ABP: --  ABP(mean): --  RR: 16 (04 Dec 2020 02:50) (16 - 20)  SpO2: 100% (03 Dec 2020 23:07) (79% - 100%)      CONSTITUTIONAL:  Ill appearing   NAD    ENT:   Airway patent,   Mouth with normal mucosa.   No thrush      CARDIAC:   Tachy   Regular rhythm.    No edema      Vascular:   normal systolic impulse  no bruits    RESPIRATORY:   No wheezing  Bilateral BS   Not tachypneic,  No use of accessory muscles    GASTROINTESTINAL:  Abdomen soft,   Non-tender,   No guarding,   + BS      NEUROLOGICAL:   Alert and oriented   No motor deficits.    SKIN:   Skin normal color for race,   No evidence of rash.      HEME LYMPH: .  No cervical  lymphadenopathy.  No inguinal lymphadenopathy              LABS:                          10.3   5.53  )-----------( 370      ( 04 Dec 2020 06:51 )             33.8                                               12-03    135  |  94<L>  |  9<L>  ----------------------------<  119<H>  4.7   |  25  |  0.5<L>    Ca    9.0      03 Dec 2020 18:56  Phos  3.3     12-03  Mg     1.8     12-03    TPro  7.0  /  Alb  3.7  /  TBili  0.2  /  DBili  x   /  AST  24  /  ALT  11  /  AlkPhos  112  12-03                                             Urinalysis Basic - ( 04 Dec 2020 00:45 )    Color: Light Yellow / Appearance: Clear / SG: >1.050 / pH: x  Gluc: x / Ketone: Trace  / Bili: Negative / Urobili: <2 mg/dL   Blood: x / Protein: Trace / Nitrite: Negative   Leuk Esterase: Negative / RBC: x / WBC x   Sq Epi: x / Non Sq Epi: x / Bacteria: x        CARDIAC MARKERS ( 03 Dec 2020 18:56 )  x     / <0.01 ng/mL / x     / x     / x                                                LIVER FUNCTIONS - ( 03 Dec 2020 18:56 )  Alb: 3.7 g/dL / Pro: 7.0 g/dL / ALK PHOS: 112 U/L / ALT: 11 U/L / AST: 24 U/L / GGT: x                                                                                                                                       X-Rays reviewed                                                                                     ECHO    CXR interpreted by me     MEDICATIONS  (STANDING):  ALPRAZolam 0.25 milliGRAM(s) Oral three times a day  budesonide 160 MICROgram(s)/formoterol 4.5 MICROgram(s) Inhaler 2 Puff(s) Inhalation two times a day  busPIRone 10 milliGRAM(s) Oral two times a day  chlorhexidine 4% Liquid 1 Application(s) Topical <User Schedule>  enoxaparin Injectable 40 milliGRAM(s) SubCutaneous daily  influenza   Vaccine 0.5 milliLiter(s) IntraMuscular once  sertraline 100 milliGRAM(s) Oral daily    MEDICATIONS  (PRN):  morphine  - Injectable 2 milliGRAM(s) IV Push every 4 hours PRN Severe Pain (7 - 10)

## 2020-12-04 NOTE — BEHAVIORAL HEALTH ASSESSMENT NOTE - NSBHSOCIALHXDETAILSFT_PSY_A_CORE
retired in 2013, previously worked at analyst for [employer], no children, lives alone,  from  since 1986 -attempt to divorce however continued to contest divorce

## 2020-12-04 NOTE — CHART NOTE - NSCHARTNOTEFT_GEN_A_CORE
Transfer Note  Transfer from: step down unit  Transfer to:  ( - ) Medicine    (  ) Telemetry    (  ) RCU    (  ) Palliative    (  ) Stroke Unit       COURSE:  64 y/o female with pertinent medical history of Asthma, Emphysema (not on home O2), Anxiety/depression presented to [facility] with worsening abdominal pain. Patient states her pain first began about 3 months ago. It initially presented itself as a sharp intermittent pain behind the navel, but over time grew to be constant with radiation to her hips/back. During this time she has also become very weak, developed dyspnea on exertion, and lost about 30lbs unintentionally. Patient denies any fevers, chills, nausea, vomiting, palpitations, or numbness/tingling. She does endorse constipation.   In the ED, vitals were /81, , RR 18, T 98.4, SPO2 93% on RA. CT Angio negative for PE, but showed multiple left upper lobe lesions suspicious for malignancy. She was monitored in the Step Down Unit and now stabilized to be transferred to floor.    She is being check for:    Unknown Malignancy?  - Overwhelming evidence of metastatic malignancy noted on imaging  - Patient is a former smoker with family history of lung cancer (mother at age 49)  - Primary source of metastases unclear at this time, spoke to IR, Dr. Cortez on board for biopsy after MRI of Thoracic and Lumbar Vertebrae  - Pain control regimen ordered (Morphine 2mg Q4 PRN IV)    - Palliative care following  - Neurosurgery evaluation appreciated - no acute surgical intervention at this time  - on decadron 4 mg every 12 hour for fracture    Anxiety/Depression  - adjusted medications as per psychiatry recommendations    Sinus tachycardia  - Unclear etiology; likelu anxiety driven  - started on low dose metoprolol 12.5 mg twice daily    History of Emphysema  - On 2 liters oxygen, saturating above 94 %  - Patient feels more comfortable with NC on, can leave it on for now  - Inhaler therapy  - antibiotics for now, follow up on procalcitonin    Macrocytic anemia  - Follow-up B12 + folate    #) Diet- regular  #) DVT prophylaxis - Lovenox 40mg sub-q QD  #) Disposition- downgrade to floor  #) Activity- increase as tolerated  #) Code status - Full         For Follow-Up:  Palliative Recommendations  IR for biopsy  MRI of thoracic and lumbar vertebrae for compression fracture of back  Heme-Onc Recommendations  Procalcitonin  Electrolytes      Vital Signs Last 24 Hrs  T(C): 36.4 (04 Dec 2020 12:30), Max: 36.9 (03 Dec 2020 17:26)  T(F): 97.6 (04 Dec 2020 12:30), Max: 98.4 (03 Dec 2020 17:26)  HR: 120 (04 Dec 2020 12:30) (98 - 143)  BP: 101/66 (04 Dec 2020 12:30) (95/64 - 122/87)  BP(mean): 74 (04 Dec 2020 08:26) (74 - 74)  RR: 18 (04 Dec 2020 12:30) (16 - 20)  SpO2: 96% (04 Dec 2020 08:26) (79% - 100%)  I&O's Summary    04 Dec 2020 07:01  -  04 Dec 2020 15:47  --------------------------------------------------------  IN: 0 mL / OUT: 1 mL / NET: -1 mL          MEDICATIONS  (STANDING):  ALPRAZolam 0.25 milliGRAM(s) Oral every 6 hours  azithromycin  IVPB      budesonide 160 MICROgram(s)/formoterol 4.5 MICROgram(s) Inhaler 2 Puff(s) Inhalation two times a day  cefTRIAXone   IVPB 1000 milliGRAM(s) IV Intermittent every 24 hours  chlorhexidine 4% Liquid 1 Application(s) Topical <User Schedule>  dexAMETHasone  Injectable 4 milliGRAM(s) IV Push every 12 hours  enoxaparin Injectable 40 milliGRAM(s) SubCutaneous daily  influenza   Vaccine 0.5 milliLiter(s) IntraMuscular once  metoprolol tartrate 12.5 milliGRAM(s) Oral two times a day  sertraline 125 milliGRAM(s) Oral daily    MEDICATIONS  (PRN):  ALPRAZolam 0.25 milliGRAM(s) Oral every 12 hours PRN anxiety  morphine  - Injectable 2 milliGRAM(s) IV Push every 4 hours PRN Severe Pain (7 - 10)        LABS                                            10.3                  Neurophils% (auto):   91.6   (12-04 @ 06:51):    5.53 )-----------(370          Lymphocytes% (auto):  5.6                                           33.8                   Eosinphils% (auto):   0.0      Manual%: Neutrophils x    ; Lymphocytes x    ; Eosinophils x    ; Bands%: x    ; Blasts x                                    137    |  98     |  7                   Calcium: 9.2   / iCa: x      (12-04 @ 06:51)    ----------------------------<  163       Magnesium: 2.2                              3.9     |  27     |  0.5              Phosphorous: x        TPro  6.5    /  Alb  3.7    /  TBili  <0.2   /  DBili  x      /  AST  25     /  ALT  11     /  AlkPhos  112    04 Dec 2020 06:51

## 2020-12-04 NOTE — CHART NOTE - NSCHARTNOTEFT_GEN_A_CORE
Palliative Care Biopsychosocial Assessment:    Patient is a 64 y/o female with PMH Asthma, emphysema (not on home O2), anxiety/depression presents to [facility] with worsening abdominal pain. Patient states her pain first began about 3 months ago. It initially presented itself as a sharp intermittent pain behind the navel, but over time grew to be constant with radiation to her hips/back. During this time she has also become very weak, developed dyspnea on exertion, and lost about 30lbs unintentionally. Palliative care team consulted for assistance with symptom management and goals of care.   Palliative care team met with the patient and friend Zehra Arroyo at bedside. Patient is A&Ox4; able to direct self care. Patient is visibly nervous/anxious, tearful. Patient's friend relating she is the patient's HCP and will forward us the document.  Friend Zehra Arroyo also relating patient has been staying with her in her apartment since the beginning of COVID as she has needed more assistance.  Patient is able to get around the apartment independently, however, utilizes a wheelchair when going out.  Patients friend Zehra Arroyo is her only caregiver/family.    Palliative care introduced in detail. Patient is waiting for further work up.  Palliative care team will continue to monitor and provide support as appropriate.     Patient Coping Status:       [   ]   coping well        [   ]    coping with  some difficulty       [  xx ]   difficulty coping     [   ]   other                                                       Patient Emotional Status:     [  xx ]   anxious         [   ]   depressed           [  xx ]  overwhelmed          [   ]   angry         [   ]accepting       [   ]   not accepting           [   ]   other     Patient Mental Status:      [  xx ]   alert              [ xx  ]   oriented         [    ]   confused         [   ] lethargic         [   ]   non-responsive   [   ]   other     Advance Directives:     [ xx  ]    Health Care Surrogate: Name:        Zehra Arroyo                      [   ]    Health Care Proxy: Name:   [   ]    MOLST  [   ]    Living Will  [   ]    DNR  [   ]    DNI    Patient Needs:     [  xx ]   Supportive Counseling                  [ xx  ]   Family Meeting            [ xx  ]    Education                           [  xx ]   Advance Care Planning         Caregiver Name: Zehra Arroyo   Caregiver needs:     [ xx  ]   Supportive Counseling      [  xx ]   Family Conference      [ xx  ]   Education    [  xx ]   Other Advance care planning     Referral:      [   ]   Community Resources         [   ]   Cancer Support Group     [   ]    Hospice       [   ]   Bereavement support     [   ]   Pastoral Care      [   ]  [facility] [place]       [   ]  Child Life Services     [ xx  ]   Other TBD                    Spectra #: [phone]

## 2020-12-04 NOTE — DIETITIAN INITIAL EVALUATION ADULT. - NSPROEDAABILITYLEARN_GEN_A_NUR
Discussed importance of oral supplementation for adequate nutrition and to help aid in wt gain. Pt able to verbalize understanding/agreement following diet ed./none

## 2020-12-04 NOTE — DIETITIAN INITIAL EVALUATION ADULT. - PHYSCIAL ASSESSMENT
underweight/alert and oriented. BMI: 15.4 using stated ht of 64", IBW: 120#, no edema noted, skin intact.

## 2020-12-04 NOTE — BEHAVIORAL HEALTH ASSESSMENT NOTE - SUICIDE PROTECTIVE FACTORS
Supportive social network of family or friends/Fear of death or the actual act of killing self/Frustration tolerance/Identifies reasons for living

## 2020-12-04 NOTE — H&P ADULT - ASSESSMENT
66 y/o female with PMH Asthma, emphysema (not on home O2), anxiety/depression presents to [facility] with worsening abdominal pain.     #) Malignancy  - Overwhelming evidence of metastatic malignancy noted on imaging  - Patient is a former smoker with family history of lung cancer (mother at age 49)  - Primary source of metastases unclear at this time  - Will consult hem/onc  - Pain control regimen ordered (Morphine 2mg Q4 PRN IV)    - Palliative care consult placed to assist with patient's comfort (she has high anxiety + pain from metastatic disease)   - Neurosurgery evaluation appreciated - no acute surgical intervention at this time    #) Anxiety/Depression  - Patient has long-standing anxiety/depression for which she tales alprazolam, buspirone, and sertraline  - Continuing all three of these medications at this time  - Can give additional benzodiazepine for breakthrough anxiety (patient had panic attack during interview, but it subsided on its own)    #) Asthma + Emphysema  - On 2L NC at time of my exam, saturating 95-96%  - Patient feels more comfortable with NC on, can leave it on for now  - Continuing budesonide/formoterol home dose   - Can add albuterol if needed  - No overt evidence of pneumonia at this time - will hold antibiotics     #) Macrocytic anemia  - Follow-up B12 + folate    #) Diet- regular  #) DVT prophylaxis - Lovenox 40mg sub-q QD  #) Disposition- TBD  #) Activity- increase as tolerated  #) Code status - Full 64 y/o female with PMH Asthma, emphysema (not on home O2), anxiety/depression presents to [facility] with worsening abdominal pain.     #) Malignancy  - Overwhelming evidence of metastatic malignancy noted on imaging  - Patient is a former smoker with family history of lung cancer (mother at age 49)  - Primary source of metastases unclear at this time  - Will consult hem/onc  - Pain control regimen ordered (Morphine 2mg Q4 PRN IV)    - Palliative care consult placed to assist with patient's comfort (she has high anxiety + pain from metastatic disease)   - Neurosurgery evaluation appreciated - no acute surgical intervention at this time    #) Anxiety/Depression  - Patient has long-standing anxiety/depression for which she tales alprazolam, buspirone, and sertraline  - Continuing all three of these medications at this time  - Can give additional benzodiazepine for breakthrough anxiety (patient had panic attack during interview, but it subsided on its own)    #) Asthma + Emphysema  - On 2L NC at time of my exam, saturating 95-96%  - Patient feels more comfortable with NC on, can leave it on for now  - Continuing budesonide/formoterol home dose   - Can add albuterol if needed  - No overt evidence of pneumonia at this time - will hold antibiotics     #) Macrocytic anemia  - Follow-up B12 + folate    #) Diet- regular  #) DVT prophylaxis - Lovenox 40mg sub-q QD  #) Disposition- TBD  #) Activity- increase as tolerated  #) Code status - Full at this time; recommend revisiting GOC discussion after hem/onc eval 64 y/o female with PMH Asthma, emphysema (not on home O2), anxiety/depression presents to [facility] with worsening abdominal pain.     #) Malignancy  - Overwhelming evidence of metastatic malignancy noted on imaging  - Patient is a former smoker with family history of lung cancer (mother at age 49)  - Primary source of metastases unclear at this time  - Will consult hem/onc  - Pain control regimen ordered (Morphine 2mg Q4 PRN IV)    - Palliative care consult placed to assist with patient's comfort (she has high anxiety + pain from metastatic disease)   - Neurosurgery evaluation appreciated - no acute surgical intervention at this time    #) Anxiety/Depression  - Patient has long-standing anxiety/depression for which she tales alprazolam, buspirone, and sertraline  - Continuing all three of these medications at this time  - Can give additional benzodiazepine for breakthrough anxiety (patient had panic attack during interview, but it subsided on its own)    #) Sinus tach  - Unclear etiology; may be anxiety driven  - Monitor for now after pain control, if still high consider cardio consult    #) Asthma + Emphysema  - On 2L NC at time of my exam, saturating 95-96%  - Patient feels more comfortable with NC on, can leave it on for now  - Continuing budesonide/formoterol home dose   - Can add albuterol if needed  - No overt evidence of pneumonia at this time - will hold antibiotics     #) Macrocytic anemia  - Follow-up B12 + folate    #) Diet- regular  #) DVT prophylaxis - Lovenox 40mg sub-q QD  #) Disposition- TBD  #) Activity- increase as tolerated  #) Code status - Full at this time; recommend revisiting GOC discussion after hem/onc eval

## 2020-12-04 NOTE — BEHAVIORAL HEALTH ASSESSMENT NOTE - NSBHCHARTREVIEWVS_PSY_A_CORE FT
ICU Vital Signs Last 24 Hrs  T(C): 36.4 (04 Dec 2020 12:30), Max: 36.9 (03 Dec 2020 17:26)  T(F): 97.6 (04 Dec 2020 12:30), Max: 98.4 (03 Dec 2020 17:26)  HR: 120 (04 Dec 2020 12:30) (98 - 143)  BP: 101/66 (04 Dec 2020 12:30) (95/64 - 122/87)  BP(mean): 74 (04 Dec 2020 08:26) (74 - 74)  ABP: --  ABP(mean): --  RR: 18 (04 Dec 2020 12:30) (16 - 20)  SpO2: 96% (04 Dec 2020 08:26) (79% - 100%)

## 2020-12-04 NOTE — BEHAVIORAL HEALTH ASSESSMENT NOTE - NSBHCHARTREVIEWLAB_PSY_A_CORE FT
10.3   5.53  )-----------( 370      ( 04 Dec 2020 06:51 )             33.8   12-04    137  |  98  |  7<L>  ----------------------------<  163<H>  3.9   |  27  |  0.5<L>    Ca    9.2      04 Dec 2020 06:51  Phos  3.3     12-03  Mg     2.2     12-04    TPro  6.5  /  Alb  3.7  /  TBili  <0.2  /  DBili  x   /  AST  25  /  ALT  11  /  AlkPhos  112  12-04

## 2020-12-04 NOTE — BEHAVIORAL HEALTH ASSESSMENT NOTE - RISK ASSESSMENT
Low Acute Suicide Risk Patient does not endorse any SI, intent or plan, has established psychiatric care, and has no prior hx of SA, SIB, or hx of suicidal behaviors.

## 2020-12-04 NOTE — DIETITIAN INITIAL EVALUATION ADULT. - FEEDING SKILL
independent/Pt with fair appetite, ate most of her eggs and Croatian toast today for breakfast, but ate ~25% of all meals yesterday. Interested in trying oral supplements for adequate nutrition.

## 2020-12-04 NOTE — DIETITIAN INITIAL EVALUATION ADULT. - MALNUTRITION
Severe Protein-Calorie Malnutrition in the context of chronic illness related to multiple left upper lobe lesions suspicious for malignancy as evidenced by severe muscle loss in the temporal/clavicle regions and subcutaneous fat loss in the buccal regions and fat overlying ribcage and <75% po intake >1 month

## 2020-12-04 NOTE — CHART NOTE - TREATMENT: THE FOLLOWING DIET HAS BEEN RECOMMENDED
Diet, Regular:   Prosource Gelatein Plus     Qty per Day:  2  Supplement Feeding Modality:  Oral  Ensure Enlive Cans or Servings Per Day:  1       Frequency:  Two Times a day (12-04-20 @ 13:50) [Pending Verification By Attending]  Diet, Regular (12-04-20 @ 00:39) [Active]

## 2020-12-04 NOTE — H&P ADULT - NSHPLABSRESULTS_GEN_ALL_CORE
LABS:                        11.3   8.70  )-----------( 463      ( 03 Dec 2020 18:56 )             36.5     12-03    135  |  94<L>  |  9<L>  ----------------------------<  119<H>  4.7   |  25  |  0.5<L>    Ca    9.0      03 Dec 2020 18:56  Phos  3.3     12-03  Mg     1.8     12-03    TPro  7.0  /  Alb  3.7  /  TBili  0.2  /  DBili  x   /  AST  24  /  ALT  11  /  AlkPhos  112  12-03          Troponin T, Serum: <0.01 ng/mL (12-03-20 @ 18:56)  Lactate, Blood: 2.5 mmol/L <H> (12-03-20 @ 18:56)      CARDIAC MARKERS ( 03 Dec 2020 18:56 )  x     / <0.01 ng/mL / x     / x     / x          RADIOLOGY:  12/3 CT Head  Impression:    1.3 cm lytic lesion within the posterior left parietal bone suspicious for metastatic disease.    No acute intracranial hemorrhage, significant displacing mass effect, or midline shift is noted. Please note that parenchymal metastatic disease cannot be excluded on noncontrast head CT and if there is persistent clinical concern a postcontrast MRI should be obtained.    12/3 CT Angio Chest/Abdomen/Pelvis  IMPRESSION:    Multiple left upper lobe pulmonary masses with conglomerate mediastinal/hilar lymphadenopathy and numerous hepatic lesions. Findings concerning for metastatic malignancy.    Radiolucent lesion in the inferior endplate of L2 is indeterminate.    No CT evidence of acute pulmonary embolus.

## 2020-12-04 NOTE — H&P ADULT - NSHPPHYSICALEXAM_GEN_ALL_CORE
VITALS:   T(F): 98.4  HR: 129  BP: 122/69  RR: 18  SpO2: 94%    PHYSICAL EXAM:  GENERAL: Cachectic, anxious. Speaks in full sentences, breathing heavy during panic attack she had during interview. Self-resolved.  HEAD: Atraumatic  NECK: Supple  CHEST/LUNG: Clear to auscultation bilaterally; No wheeze or crackles  HEART: S1, S2; RRR; No murmurs, rubs, or gallops  ABDOMEN: Tender to palpation in right periumbilical region.   EXTREMITIES:  2+ Peripheral Pulses, No clubbing, cyanosis, or edema  PSYCH: AAOx3  NEUROLOGY: non-focal  SKIN: No rashes or lesions

## 2020-12-04 NOTE — CONSULT NOTE ADULT - ASSESSMENT
IMPRESSION:    COPD   Probable metastatic malignancy   RO Spinal cord compression, Followed by [facility]   HO Anxiety     PLAN:    CNS:  Psych eval. Steroid     HEENT: Oral care    PULMONARY:  HOB @ 45 degrees.  Aspiration precautions . Nebs PRN     CARDIOVASCULAR:  ECHO, TSH.      GI: GI prophylaxis.  Feeding.  Bowel regimen     RENAL:  Follow up lytes.  Correct as needed    INFECTIOUS DISEASE: Follow up cultures.  UNasyn for now. Procal     HEMATOLOGICAL:  DVT prophylaxis.  LE Duplex.    ENDOCRINE:  Follow up FS.  Insulin protocol if needed.    MUSCULOSKELETAL:  OOB to chair     Possible downgrade PM

## 2020-12-04 NOTE — BEHAVIORAL HEALTH ASSESSMENT NOTE - OTHER PAST PSYCHIATRIC HISTORY (INCLUDE DETAILS REGARDING ONSET, COURSE OF ILLNESS, INPATIENT/OUTPATIENT TREATMENT)
dx w/ depression, anxiety , agoraphobia in 2016, followed by psychiatrist Dr. Davidson, no current therapy, no prior SA, IPP

## 2020-12-04 NOTE — DIETITIAN INITIAL EVALUATION ADULT. - ADD RECOMMEND
1. Order Ensure Enlive + Prosource Gelatein both BID, 2. Consider daily MVI if not medically contraindicated--all recs discussed with LIP ([id number])

## 2020-12-04 NOTE — BEHAVIORAL HEALTH ASSESSMENT NOTE - NSBHCHARTREVIEWINVESTIGATE_PSY_A_CORE FT
< from: 12 Lead ECG (12.03.20 @ 18:42) >      QTC Calculation(Bazett) 462 ms    < end of copied text >

## 2020-12-04 NOTE — BEHAVIORAL HEALTH ASSESSMENT NOTE - NSBHCONSULTMEDS_PSY_A_CORE FT
please increase xanax 0.25mg po TID to 0.25mg po QID  please increase zoloft 100mg po daily to 125mg po daily   please discontinue buspar please increase xanax 0.25mg po TID to 0.25mg po four times a day preferably ( 8 am, 12pm, 4pm and 8 pm doses)  please increase zoloft 100mg po daily to 125mg po daily   please discontinue buspar

## 2020-12-04 NOTE — BEHAVIORAL HEALTH ASSESSMENT NOTE - SUMMARY
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 Psychiatry consulted for management of anxiety.    Upon evaluation, patient presenting with worsening anxiety and panic attack symptoms consistent with panic disorder w/ agoraphobia. Her current symptoms appear to be exacerbated in the setting of deteriorating health as patient has been diagnosed with metastatic cancer. She is not suicidal, homicidal, manic or psychotic and does not require nor will benefit from IPP admission. Patient however will benefit from increased dose of her SSRI and anxiolytic given her worsening symptoms and increased stressors.     Recommendations:  -increase xanax 0.25mg po TID to 0.25mg po QID  -increase zoloft 100mg po daily to 125mg po daily   -start xanax 0.25mg po BID PRN   -please discontinue buspar  -pt to follow up with her outpatient psychiatrist Dr. Amaya Davidson upon discharge

## 2020-12-04 NOTE — CONSULT NOTE ADULT - ATTENDING COMMENTS
Agree with above plan.  We'll await MRI of lumbar spine, and pelvis.
65 year old woman wit history of depression/anxiety and emphysema presents with weight loss and abdominal pain, found to have findings on imaging concerning for malignancy.  Palliative care consulted for GOC and symptom management.    Patient seen at bedside per NP note above.  She noted significant anxiety. At this time, as psychiatry is now following, will defer psychiatric treatment to psychiatry.    As for patient's pain, patient has pain but has used no PRNs in 24 hours. Will see PRN use over weekend and adjust meds as needed over/after weekend.    Palliative care will continue to follow.

## 2020-12-04 NOTE — CONSULT NOTE ADULT - SUBJECTIVE AND OBJECTIVE BOX
REQUESTED OF: DR Covarrubias    Chart reviewed, Hospital Day 1    SOFI ESTRADA 65yFemale  HPI:  66 y/o female with PMH Asthma, emphysema (not on home O2), anxiety/depression presents to [facility] with worsening abdominal pain. Patient states her pain first began about 3 months ago. It initially presented itself as a sharp intermittent pain behind the navel, but over time grew to be constant with radiation to her hips/back. During this time she has also become very weak, developed dyspnea on exertion, and lost about 30lbs unintentionally. Patient denies any fevers, chills, nausea, vomiting, palpitations, or numbness/tingling. She does endorse constipation. All other ROS negative.     In the ED, vitals were /81, , RR 18, T 98.4, SPO2 93% on RA. CT Angio negative for PE, but showed multiple left upper lobe lesions suspicious for malignancy. Also evidence of liver and bone metastatic disease. Admitted to medicine for further management.  (04 Dec 2020 00:06)        PAST MEDICAL & SURGICAL HISTORY:  Depression    Agoraphobia    Anxiety    Emphysema lung    No significant past surgical history        Subjective and Objective:  Today,  Discussed with     Focused Palliative Care Evaluation:                   Symptoms:                                      Pain                                     Dyspnea                                     N/V                                     Appetite                                     Anxiety                                     Other _____________________                     Support Devices:              PHYSICAL EXAM:      Constitutional:    Eyes:    ENMT:    Neck:    Breasts:    Back:    Respiratory:    Cardiovascular:    Gastrointestinal:    Genitourinary:    Rectal:    Extremities:    Vascular:    Neurological:    Skin:    Lymph Nodes:    Musculoskeletal:    Psychiatric:        T(C): 36.5, Max: 36.9 (17:26)  HR: 118 (98 - 143)  BP: 98/60 (95/64 - 122/87)  RR: 18 (16 - 20)  SpO2: 96% (79% - 100%)      LABS/STUDIES:  12-04    137  |  98  |  7<L>  ----------------------------<  163<H>  3.9   |  27  |  0.5<L>    Ca    9.2      04 Dec 2020 06:51  Phos  3.3     12-03  Mg     2.2     12-04    TPro  6.5  /  Alb  3.7  /  TBili  <0.2  /  DBili  x   /  AST  25  /  ALT  11  /  AlkPhos  112  12-04                            10.3   5.53  )-----------( 370      ( 04 Dec 2020 06:51 )             33.8       MEDICATIONS  (STANDING):  ALPRAZolam 0.25 milliGRAM(s) Oral three times a day  azithromycin  IVPB      budesonide 160 MICROgram(s)/formoterol 4.5 MICROgram(s) Inhaler 2 Puff(s) Inhalation two times a day  busPIRone 10 milliGRAM(s) Oral two times a day  cefTRIAXone   IVPB 1000 milliGRAM(s) IV Intermittent every 24 hours  chlorhexidine 4% Liquid 1 Application(s) Topical <User Schedule>  dexAMETHasone  Injectable 4 milliGRAM(s) IV Push every 12 hours  enoxaparin Injectable 40 milliGRAM(s) SubCutaneous daily  influenza   Vaccine 0.5 milliLiter(s) IntraMuscular once  sertraline 100 milliGRAM(s) Oral daily    MEDICATIONS  (PRN):  morphine  - Injectable 2 milliGRAM(s) IV Push every 4 hours PRN Severe Pain (7 - 10)          iStop: Reference #: [id number]    Others' Prescriptions  Patient Name: Sofi Chavira Date: 1955  Address: [address] [place] [id number]: Female  Rx Written	Rx Dispensed	Drug	Quantity	Days Supply	Prescriber Name	Payment Method	Dispenser  11/13/2020	11/13/2020	alprazolam 0.25 mg tablet	90	30	[facility]  09/04/2020	09/30/2020	alprazolam 0.25 mg tablet	90	30	[facility]  08/07/2020	08/28/2020	alprazolam 0.25 mg tablet	90	30	[facility]  07/09/2020	07/27/2020	alprazolam 0.25 mg tablet	90	30	[place]  06/11/2020	06/15/2020	alprazolam 0.25 mg tablet	90	30	[facility]  05/15/2020	05/15/2020	alprazolam 0.25 mg tablet	90	30	[facility]  03/24/2020	03/27/2020	alprazolam 0.25 mg tablet	90	30	[place], Amaya	Insurance	[facility]  02/07/2020	02/13/2020	alprazolam 0.25 mg tablet	90	30	[place], Amaya	Insurance	[facility]  01/10/2020	01/13/2020	alprazolam 0.25 mg tablet	90	30	[facility]	Insurance	[facility]  12/13/2019	12/13/2019	alprazolam 0.25 mg tablet	90	30	[facility] #[id number]        PPS  Level    __________       Note PPS = Palliative Performance Scale; (c)2001, [employer]       Range from 100% meaning Full ambulation/self-care/intake/Level of Consciousness                                                                              to        10% meaning Bedbound/Unable to do any activity/extensive disease /Total Care/ No PO intake/ LOC=Full/drowsy/+/-confusion        (0% = death)                     Prior to acute illness, patient's functionality reportedly                                                                                                                                                                                    REQUESTED OF: DR Covarrubias    Chart reviewed, Hospital Day 1    SOFI ESTRADA 65yFemale  HPI:  66 y/o female with PMH Asthma, emphysema (not on home O2), anxiety/depression presents to [facility] with worsening abdominal pain. Patient states her pain first began about 3 months ago. It initially presented itself as a sharp intermittent pain behind the navel, but over time grew to be constant with radiation to her hips/back. During this time she has also become very weak, developed dyspnea on exertion, and lost about 30lbs unintentionally. Patient denies any fevers, chills, nausea, vomiting, palpitations, or numbness/tingling. She does endorse constipation. All other ROS negative.     In the ED, vitals were /81, , RR 18, T 98.4, SPO2 93% on RA. CT Angio negative for PE, but showed multiple left upper lobe lesions suspicious for malignancy. Also evidence of liver and bone metastatic disease. Admitted to medicine for further management.  (04 Dec 2020 00:06)    PAST MEDICAL & SURGICAL HISTORY:  Depression    Agoraphobia    Anxiety    Emphysema lung    No significant past surgical history      Subjective and Objective:      Focused Palliative Care Evaluation:                   Symptoms:                                     Pain - abdominal - like a pulling pain '6' on admission it was 10; R shoulder pain                                      Dyspnea 0                                     N/V 0                                     Appetite 0                                     Anxiety ++ able to share insight about her anxiety - at night was trying to get out of bed and lap belt applied; talked about cancer without increase in anxiety; it was reduced when nursing staff bathed her with cool water per her request; shared her diagnosis of agoraphobia; agrees to having additional xanax available; needs to have albuterol in hand                                      Other _____________________                     Support Devices: saw post HD       PHYSICAL EXAM: sitting in recliner    Constitutional: frail; cachectic     Eyes: PER    Respiratory: easy; unlabored;     Cardiovascular: HRR palpable radial pulses    Gastrointestinal: slight tense; difficult to examine due to in chair; NT    Extremities: +dependent edema    Neurological: no focal deficits    Affect: appropriate; except when taking about panic feelings    T(C): 36.5, Max: 36.9 (17:26)  HR: 118 (98 - 143)  BP: 98/60 (95/64 - 122/87)  RR: 18 (16 - 20)  SpO2: 96% (79% - 100%)    LABS/STUDIES:  12-04    137  |  98  |  7<L>  ----------------------------<  163<H>  3.9   |  27  |  0.5<L>    Ca    9.2      04 Dec 2020 06:51  Phos  3.3     12-03  Mg     2.2     12-04    TPro  6.5  /  Alb  3.7  /  TBili  <0.2  /  DBili  x   /  AST  25  /  ALT  11  /  AlkPhos  112  12-04                            10.3   5.53  )-----------( 370      ( 04 Dec 2020 06:51 )             33.8       MEDICATIONS  (STANDING):  ALPRAZolam 0.25 milliGRAM(s) Oral three times a day  azithromycin  IVPB      budesonide 160 MICROgram(s)/formoterol 4.5 MICROgram(s) Inhaler 2 Puff(s) Inhalation two times a day  busPIRone 10 milliGRAM(s) Oral two times a day  cefTRIAXone   IVPB 1000 milliGRAM(s) IV Intermittent every 24 hours  chlorhexidine 4% Liquid 1 Application(s) Topical <User Schedule>  dexAMETHasone  Injectable 4 milliGRAM(s) IV Push every 12 hours  enoxaparin Injectable 40 milliGRAM(s) SubCutaneous daily  influenza   Vaccine 0.5 milliLiter(s) IntraMuscular once  sertraline 100 milliGRAM(s) Oral daily    MEDICATIONS  (PRN):  morphine  - Injectable 2 milliGRAM(s) IV Push every 4 hours PRN Severe Pain (7 - 10) odoses    iStop: Reference #: [id number]    Others' Prescriptions  Patient Name: Sofi Chavira Date: 1955  Address: [address] [place] [id number]: Female  Rx Written	Rx Dispensed	Drug	Quantity	Days Supply	Prescriber Name	Payment Method	Dispenser  11/13/2020	11/13/2020	alprazolam 0.25 mg tablet	90	30	[facility]  09/04/2020	09/30/2020	alprazolam 0.25 mg tablet	90	30	[facility]  08/07/2020	08/28/2020	alprazolam 0.25 mg tablet	90	30	[place]  07/09/2020	07/27/2020	alprazolam 0.25 mg tablet	90	30	[place], Amaya	Mariee	[facility]  06/11/2020	06/15/2020	alprazolam 0.25 mg tablet	90	30	[place], Amaya	Insurance	[facility]  05/15/2020	05/15/2020	alprazolam 0.25 mg tablet	90	30	[facility] Amaya	Insurance	[facility]  03/24/2020	03/27/2020	alprazolam 0.25 mg tablet	90	30	[facility] Amaya	Insurance	[facility]  02/07/2020	02/13/2020	alprazolam 0.25 mg tablet	90	30	[facility]	Insurance	[facility]  01/10/2020	01/13/2020	alprazolam 0.25 mg tablet	90	30	[facility]	Insurance	[facility]  12/13/2019	12/13/2019	alprazolam 0.25 mg tablet	90	30	[facility]	Insurance	[facility] #[id number]        PPS  Level    __________       Note PPS = Palliative Performance Scale; (c)2001, [employer]       Range from 100% meaning Full ambulation/self-care/intake/Level of Consciousness                                                                              to        10% meaning Bedbound/Unable to do any activity/extensive disease /Total Care/ No PO intake/ LOC=Full/drowsy/+/-confusion        (0% = death)                     Prior to acute illness, patient's functionality reportedly                                                                                                                                                                                    REQUESTED OF: DR Covarrubias    Chart reviewed, Hospital Day 2    SOFI ESTRADA 65yFemale  HPI:  64 y/o female with PMH Asthma, emphysema (not on home O2), anxiety/depression presents to [facility] with worsening abdominal pain. Patient states her pain first began about 3 months ago. It initially presented itself as a sharp intermittent pain behind the navel, but over time grew to be constant with radiation to her hips/back. During this time she has also become very weak, developed dyspnea on exertion, and lost about 30lbs unintentionally. Patient denies any fevers, chills, nausea, vomiting, palpitations, or numbness/tingling. She does endorse constipation. All other ROS negative.     In the ED, vitals were /81, , RR 18, T 98.4, SPO2 93% on RA. CT Angio negative for PE, but showed multiple left upper lobe lesions suspicious for malignancy. Also evidence of liver and bone metastatic disease. Admitted to medicine for further management.  (04 Dec 2020 00:06)    PAST MEDICAL & SURGICAL HISTORY:  Depression    Agoraphobia    Anxiety    Emphysema lung    No significant past surgical history      Subjective and Objective:      Focused Palliative Care Evaluation:                   Symptoms:                                     Pain - abdominal - like a pulling pain '6' on admission it was 10; R shoulder pain                                      Dyspnea 0                                     N/V 0                                     Appetite 0                                     Anxiety ++ able to share insight about her anxiety - at night was trying to get out of bed and lap belt applied; talked about cancer without increase in anxiety; it was reduced when nursing staff bathed her with cool water per her request; shared her diagnosis of agoraphobia; agrees to having additional xanax available; needs to have albuterol in hand                                      Other _____________________                     Support Devices: saw post HD       PHYSICAL EXAM: sitting in recliner    Constitutional: frail; cachectic     Eyes: PER, no scleral icterus    Respiratory: easy; unlabored; no wheezing, no accessory muscle use    Cardiovascular: HRR palpable radial pulses    Gastrointestinal: slight tense; difficult to examine due to in chair; NT    Extremities: +dependent edema    Neurological: no focal deficits    Affect: appropriate; except when taking about panic feelings    T(C): 36.5, Max: 36.9 (17:26)  HR: 118 (98 - 143)  BP: 98/60 (95/64 - 122/87)  RR: 18 (16 - 20)  SpO2: 96% (79% - 100%)    LABS/STUDIES:  12-04    137  |  98  |  7<L>  ----------------------------<  163<H>  3.9   |  27  |  0.5<L>    Ca    9.2      04 Dec 2020 06:51  Phos  3.3     12-03  Mg     2.2     12-04    TPro  6.5  /  Alb  3.7  /  TBili  <0.2  /  DBili  x   /  AST  25  /  ALT  11  /  AlkPhos  112  12-04                            10.3   5.53  )-----------( 370      ( 04 Dec 2020 06:51 )             33.8       MEDICATIONS  (STANDING):  ALPRAZolam 0.25 milliGRAM(s) Oral three times a day  azithromycin  IVPB      budesonide 160 MICROgram(s)/formoterol 4.5 MICROgram(s) Inhaler 2 Puff(s) Inhalation two times a day  busPIRone 10 milliGRAM(s) Oral two times a day  cefTRIAXone   IVPB 1000 milliGRAM(s) IV Intermittent every 24 hours  chlorhexidine 4% Liquid 1 Application(s) Topical <User Schedule>  dexAMETHasone  Injectable 4 milliGRAM(s) IV Push every 12 hours  enoxaparin Injectable 40 milliGRAM(s) SubCutaneous daily  influenza   Vaccine 0.5 milliLiter(s) IntraMuscular once  sertraline 100 milliGRAM(s) Oral daily    MEDICATIONS  (PRN):  morphine  - Injectable 2 milliGRAM(s) IV Push every 4 hours PRN Severe Pain (7 - 10) odoses    iStop: Reference #: [id number]    Others' Prescriptions  Patient Name: Sofi Chavira Date: 1955  Address: [address] [place] [id number]: Female  Rx Written	Rx Dispensed	Drug	Quantity	Days Supply	Prescriber Name	Payment Method	Dispenser  11/13/2020	11/13/2020	alprazolam 0.25 mg tablet	90	30	[facility]  09/04/2020	09/30/2020	alprazolam 0.25 mg tablet	90	30	[facility]  08/07/2020	08/28/2020	alprazolam 0.25 mg tablet	90	30	[place], Amaya	Insurance	[facility]  07/09/2020	07/27/2020	alprazolam 0.25 mg tablet	90	30	[place], Amaya	Mariee	[place]  06/11/2020	06/15/2020	alprazolam 0.25 mg tablet	90	30	[place], Amaya	Insurance	[place] [facility]  05/15/2020	05/15/2020	alprazolam 0.25 mg tablet	90	30	[place], Amaya	Insurance	[facility]  03/24/2020	03/27/2020	alprazolam 0.25 mg tablet	90	30	[place], Amaya	Insurance	[place] [facility]  02/07/2020	02/13/2020	alprazolam 0.25 mg tablet	90	30	[place], Amaya	Insurance	[facility]  01/10/2020	01/13/2020	alprazolam 0.25 mg tablet	90	30	[place], Amaya	Insurance	[facility]  12/13/2019	12/13/2019	alprazolam 0.25 mg tablet	90	30	[place], Amaya	Insurance	[facility] #[id number]        PPS  Level    40%       Note PPS = Palliative Performance Scale; (c)2001, [employer]       Range from 100% meaning Full ambulation/self-care/intake/Level of Consciousness                                                                              to        10% meaning Bedbound/Unable to do any activity/extensive disease /Total Care/ No PO intake/ LOC=Full/drowsy/+/-confusion        (0% = death)

## 2020-12-04 NOTE — CONSULT NOTE ADULT - ASSESSMENT
Consult Summary  65y F with h/o depression/anxiety; agoraphobia; emaphysema/asthma presented to ED with 3m of abdominal pain infrequent bowl movements and 30lbs weight loss x3m. Hospital day 1 and since CT abd shows multiple pulmonary lesions suspicious for malignancy. Multiple findings of bony metastatic disease including the Left iliac bone with a 6.3 cm soft tissue component that displaces the retroperitoneum and left kidney. Acute pathologic compression fracture of the  left L5 vertebral body, with mild endplate depression. Metastatic lytic lesion in L2 vertebral body, also the right L4 posterior elements. Indeterminate 1.8 cm hyperenhancing nodule in the pelvis, may be associated with the mesentery. Neuro surg - no intervention at this time.     Morphine Equivalent Daily Dose (MEDD):         Recommendations:          Please Call [phone] PRN      Consult Summary  65y F with h/o depression/anxiety; agoraphobia; emaphysema/asthma presented to ED with 3m of abdominal pain infrequent bowl movements and 30lbs weight loss x3m. Hospital day 1 and since CT abd shows multiple pulmonary lesions suspicious for malignancy. Multiple findings of bony metastatic disease including the Left iliac bone with a 6.3 cm soft tissue component that displaces the retroperitoneum and left kidney. Acute pathologic compression fracture of the  left L5 vertebral body, with mild endplate depression. Metastatic lytic lesion in L2 vertebral body, also the right L4 posterior elements. Indeterminate 1.8 cm hyperenhancing nodule in the pelvis, may be associated with the mesentery. Neuro surg - no intervention at this time.     Morphine Equivalent Daily Dose (MEDD):         Recommendations:    Continue current medical management  Introduced palliative care  Patient responds to non-pharmacolgical interventions: calm approach; allow time to express needs and address accordingly; avoid restraints; and leave lower two siderails of bed down as they contribute to her agoraphobia  Xanax 0.25mg PO twice day PRN for anxiety; otherwise as per psych recommendations  continue morphine 2mg IVP q4H PRN for pain  right femur /shoulder Xray   Patient has HCP, Zehra Arroyo, who will email form    D/W house staff and nursing    Will follow and address GOC  Please Call [phone] PRN      Consult Summary  65y F with h/o depression/anxiety; agoraphobia; emaphysema/asthma presented to ED with 3m of abdominal pain infrequent bowl movements and 30lbs weight loss x3m. Hospital day 1 and since CT abd shows multiple pulmonary lesions suspicious for malignancy. Multiple findings of bony metastatic disease including the Left iliac bone with a 6.3 cm soft tissue component that displaces the retroperitoneum and left kidney. Acute pathologic compression fracture of the  left L5 vertebral body, with mild endplate depression. Metastatic lytic lesion in L2 vertebral body, also the right L4 posterior elements. Indeterminate 1.8 cm hyperenhancing nodule in the pelvis, may be associated with the mesentery. Neuro surg - no intervention at this time.     Morphine Equivalent Daily Dose (MEDD):  0mg      Recommendations:    Continue current medical management  Introduced palliative care  Patient responds to non-pharmacolgical interventions: calm approach; allow time to express needs and address accordingly; avoid restraints; and leave lower two siderails of bed down as they contribute to her agoraphobia  Xanax 0.25mg PO twice day PRN for anxiety; otherwise as per psych recommendations  continue morphine 2mg IVP q4H PRN for pain  right femur /shoulder Xray   Patient has HCP, Zehra Arroyo, who will email form    D/W house staff and nursing    Will follow and address [facility]  Please Call [phone] PRN

## 2020-12-04 NOTE — CONSULT NOTE ADULT - SUBJECTIVE AND OBJECTIVE BOX
INTERVENTIONAL RADIOLOGY CONSULT:     Procedure Requested: lumbar bone biopsy    HPI:  64 y/o female with PMH Asthma, emphysema (not on home O2), anxiety/depression presents to [facility] with worsening abdominal pain. Patient states her pain first began about 3 months ago. It initially presented itself as a sharp intermittent pain behind the navel, but over time grew to be constant with radiation to her hips/back. During this time she has also become very weak, developed dyspnea on exertion, and lost about 30lbs unintentionally. Patient denies any fevers, chills, nausea, vomiting, palpitations, or numbness/tingling. She does endorse constipation. All other ROS negative.     In the ED, vitals were /81, , RR 18, T 98.4, SPO2 93% on RA. CT Angio negative for PE, but showed multiple left upper lobe lesions suspicious for malignancy. Also evidence of liver and bone metastatic disease. Admitted to medicine for further management.  (04 Dec 2020 00:06)    PAST MEDICAL & SURGICAL HISTORY:  Depression  Agoraphobia  Anxiety  Emphysema lung  No significant past surgical history    MEDICATIONS  (STANDING):  ALPRAZolam 0.25 milliGRAM(s) Oral every 6 hours  azithromycin  IVPB      budesonide 160 MICROgram(s)/formoterol 4.5 MICROgram(s) Inhaler 2 Puff(s) Inhalation two times a day  cefTRIAXone   IVPB 1000 milliGRAM(s) IV Intermittent every 24 hours  chlorhexidine 4% Liquid 1 Application(s) Topical <User Schedule>  dexAMETHasone  Injectable 4 milliGRAM(s) IV Push every 12 hours  enoxaparin Injectable 40 milliGRAM(s) SubCutaneous daily  influenza   Vaccine 0.5 milliLiter(s) IntraMuscular once  LORazepam   Injectable 1 milliGRAM(s) IV Push once  metoprolol tartrate 12.5 milliGRAM(s) Oral two times a day  sertraline 125 milliGRAM(s) Oral daily    MEDICATIONS  (PRN):  ALPRAZolam 0.25 milliGRAM(s) Oral every 12 hours PRN anxiety  morphine  - Injectable 2 milliGRAM(s) IV Push every 4 hours PRN Severe Pain (7 - 10)      Allergies    penicillins (Unknown)    Intolerances          FAMILY HISTORY:  FH: stroke    FH: lung cancer  Mother; age 49        Physical Exam:   Vital Signs Last 24 Hrs  T(C): 35.6 (04 Dec 2020 16:04), Max: 36.9 (03 Dec 2020 17:26)  T(F): 96.1 (04 Dec 2020 16:04), Max: 98.4 (03 Dec 2020 17:26)  HR: 122 (04 Dec 2020 16:04) (98 - 143)  BP: 101/67 (04 Dec 2020 16:04) (95/64 - 122/87)  BP(mean): 76 (04 Dec 2020 16:04) (74 - 76)  RR: 18 (04 Dec 2020 16:04) (16 - 20)  SpO2: 96% (04 Dec 2020 08:26) (79% - 100%)       Labs:                         10.3   5.53  )-----------( 370      ( 04 Dec 2020 06:51 )             33.8     12-04    137  |  98  |  7<L>  ----------------------------<  163<H>  3.9   |  27  |  0.5<L>    Ca    9.2      04 Dec 2020 06:51  Phos  3.3     12-03  Mg     2.2     12-04    TPro  6.5  /  Alb  3.7  /  TBili  <0.2  /  DBili  x   /  AST  25  /  ALT  11  /  AlkPhos  112  12-04      Radiology imaging reviewed.     ASSESSMENT/ PLAN:   64 y/o female with PMH Asthma, emphysema (not on home O2), anxiety/depression presents with worsening abdominal pain. CT showed multiple left upper lobe lesions suspicious for malignancy with evidence of liver and bone metastatic disease.  On review of CT abdomen/pelvis, there is suspicious lumbar spine lesions with compression deformity of L5  -Follow up MRI of the thoracic and lumbar spine.  -If MRI confirms L5 lesion, plan for biopsy with ablation and kyphoplasty next week      Thank you for the courtesy of this consult, please call [phone] with any further questions.

## 2020-12-04 NOTE — CONSULT NOTE ADULT - CONSULT REASON
Patient presents with unrelenting abdominal pain. Found to have numerous metastatic lesions. Also has high anxiety/panic attacks
Weakness
abnormal CT
bone mets

## 2020-12-05 LAB
ALBUMIN SERPL ELPH-MCNC: 3.5 G/DL — SIGNIFICANT CHANGE UP (ref 3.5–5.2)
ALP SERPL-CCNC: 103 U/L — SIGNIFICANT CHANGE UP (ref 30–115)
ALT FLD-CCNC: 12 U/L — SIGNIFICANT CHANGE UP (ref 0–41)
ANION GAP SERPL CALC-SCNC: 13 MMOL/L — SIGNIFICANT CHANGE UP (ref 7–14)
AST SERPL-CCNC: 22 U/L — SIGNIFICANT CHANGE UP (ref 0–41)
BASOPHILS # BLD AUTO: 0.02 K/UL — SIGNIFICANT CHANGE UP (ref 0–0.2)
BASOPHILS NFR BLD AUTO: 0.2 % — SIGNIFICANT CHANGE UP (ref 0–1)
BILIRUB SERPL-MCNC: <0.2 MG/DL — SIGNIFICANT CHANGE UP (ref 0.2–1.2)
BUN SERPL-MCNC: 10 MG/DL — SIGNIFICANT CHANGE UP (ref 10–20)
CALCIUM SERPL-MCNC: 9 MG/DL — SIGNIFICANT CHANGE UP (ref 8.5–10.1)
CHLORIDE SERPL-SCNC: 96 MMOL/L — LOW (ref 98–110)
CO2 SERPL-SCNC: 27 MMOL/L — SIGNIFICANT CHANGE UP (ref 17–32)
CREAT SERPL-MCNC: <0.5 MG/DL — LOW (ref 0.7–1.5)
CULTURE RESULTS: SIGNIFICANT CHANGE UP
EOSINOPHIL # BLD AUTO: 0 K/UL — SIGNIFICANT CHANGE UP (ref 0–0.7)
EOSINOPHIL NFR BLD AUTO: 0 % — SIGNIFICANT CHANGE UP (ref 0–8)
GLUCOSE SERPL-MCNC: 95 MG/DL — SIGNIFICANT CHANGE UP (ref 70–99)
HCT VFR BLD CALC: 32.4 % — LOW (ref 37–47)
HGB BLD-MCNC: 9.9 G/DL — LOW (ref 12–16)
IMM GRANULOCYTES NFR BLD AUTO: 0.3 % — SIGNIFICANT CHANGE UP (ref 0.1–0.3)
LYMPHOCYTES # BLD AUTO: 0.58 K/UL — LOW (ref 1.2–3.4)
LYMPHOCYTES # BLD AUTO: 6.5 % — LOW (ref 20.5–51.1)
MAGNESIUM SERPL-MCNC: 1.9 MG/DL — SIGNIFICANT CHANGE UP (ref 1.8–2.4)
MCHC RBC-ENTMCNC: 30.6 G/DL — LOW (ref 32–37)
MCHC RBC-ENTMCNC: 31 PG — SIGNIFICANT CHANGE UP (ref 27–31)
MCV RBC AUTO: 101.6 FL — HIGH (ref 81–99)
MONOCYTES # BLD AUTO: 0.48 K/UL — SIGNIFICANT CHANGE UP (ref 0.1–0.6)
MONOCYTES NFR BLD AUTO: 5.4 % — SIGNIFICANT CHANGE UP (ref 1.7–9.3)
NEUTROPHILS # BLD AUTO: 7.76 K/UL — HIGH (ref 1.4–6.5)
NEUTROPHILS NFR BLD AUTO: 87.6 % — HIGH (ref 42.2–75.2)
NRBC # BLD: 0 /100 WBCS — SIGNIFICANT CHANGE UP (ref 0–0)
PLATELET # BLD AUTO: 407 K/UL — HIGH (ref 130–400)
POTASSIUM SERPL-MCNC: 4.1 MMOL/L — SIGNIFICANT CHANGE UP (ref 3.5–5)
POTASSIUM SERPL-SCNC: 4.1 MMOL/L — SIGNIFICANT CHANGE UP (ref 3.5–5)
PROCALCITONIN SERPL-MCNC: 0.1 NG/ML — SIGNIFICANT CHANGE UP (ref 0.02–0.1)
PROCALCITONIN SERPL-MCNC: 0.11 NG/ML — HIGH (ref 0.02–0.1)
PROT SERPL-MCNC: 6.5 G/DL — SIGNIFICANT CHANGE UP (ref 6–8)
RBC # BLD: 3.19 M/UL — LOW (ref 4.2–5.4)
RBC # FLD: 14.6 % — HIGH (ref 11.5–14.5)
SODIUM SERPL-SCNC: 136 MMOL/L — SIGNIFICANT CHANGE UP (ref 135–146)
SPECIMEN SOURCE: SIGNIFICANT CHANGE UP
WBC # BLD: 8.87 K/UL — SIGNIFICANT CHANGE UP (ref 4.8–10.8)
WBC # FLD AUTO: 8.87 K/UL — SIGNIFICANT CHANGE UP (ref 4.8–10.8)

## 2020-12-05 PROCEDURE — 99233 SBSQ HOSP IP/OBS HIGH 50: CPT

## 2020-12-05 RX ADMIN — ENOXAPARIN SODIUM 40 MILLIGRAM(S): 100 INJECTION SUBCUTANEOUS at 11:46

## 2020-12-05 RX ADMIN — Medication 0.25 MILLIGRAM(S): at 08:07

## 2020-12-05 RX ADMIN — Medication 4 MILLIGRAM(S): at 05:29

## 2020-12-05 RX ADMIN — Medication 12.5 MILLIGRAM(S): at 17:38

## 2020-12-05 RX ADMIN — BUDESONIDE AND FORMOTEROL FUMARATE DIHYDRATE 2 PUFF(S): 160; 4.5 AEROSOL RESPIRATORY (INHALATION) at 20:31

## 2020-12-05 RX ADMIN — BUDESONIDE AND FORMOTEROL FUMARATE DIHYDRATE 2 PUFF(S): 160; 4.5 AEROSOL RESPIRATORY (INHALATION) at 08:08

## 2020-12-05 RX ADMIN — CEFTRIAXONE 100 MILLIGRAM(S): 500 INJECTION, POWDER, FOR SOLUTION INTRAMUSCULAR; INTRAVENOUS at 14:25

## 2020-12-05 RX ADMIN — CHLORHEXIDINE GLUCONATE 1 APPLICATION(S): 213 SOLUTION TOPICAL at 05:29

## 2020-12-05 RX ADMIN — Medication 1 MILLIGRAM(S): at 10:56

## 2020-12-05 RX ADMIN — Medication 12.5 MILLIGRAM(S): at 05:29

## 2020-12-05 RX ADMIN — SERTRALINE 125 MILLIGRAM(S): 25 TABLET, FILM COATED ORAL at 11:46

## 2020-12-05 RX ADMIN — MORPHINE SULFATE 2 MILLIGRAM(S): 50 CAPSULE, EXTENDED RELEASE ORAL at 07:15

## 2020-12-05 RX ADMIN — AZITHROMYCIN 255 MILLIGRAM(S): 500 TABLET, FILM COATED ORAL at 08:08

## 2020-12-05 RX ADMIN — MORPHINE SULFATE 2 MILLIGRAM(S): 50 CAPSULE, EXTENDED RELEASE ORAL at 07:01

## 2020-12-05 RX ADMIN — Medication 4 MILLIGRAM(S): at 17:05

## 2020-12-05 RX ADMIN — Medication 0.25 MILLIGRAM(S): at 11:46

## 2020-12-05 NOTE — PROGRESS NOTE ADULT - ASSESSMENT
a/p:    #abdominal pain- suspected metastatic cancer (unknown primary)  -incidental finding on CTA (Left upper lobe lung lesion and multiple bone/liver mets?)  -malignancy w/u---awaiting MRI Thoracic/lumbar spine (will attempt to get done today- yesterday she refused)  -neurosurgery following--remains on dexa 4 mg iv q12 hr  -pain control- morphine  -needs IR biopsy after MRI  -heme/onc recomm  -palliative care following  -awaiting transfer to regular medical floor once bed available    #DVT/GI ppx  FULL CODE    guarded prognosis--awaiting transfer to regular medical floor out of vent unit

## 2020-12-05 NOTE — PROGRESS NOTE ADULT - SUBJECTIVE AND OBJECTIVE BOX
Patient is a 65y old  Female who presents with a chief complaint of abdominal pain (05 Dec 2020 12:31)    HPI:  66 y/o female with PMH Asthma, emphysema (not on home O2), anxiety/depression presents to [facility] with worsening abdominal pain. Patient states her pain first began about 3 months ago. It initially presented itself as a sharp intermittent pain behind the navel, but over time grew to be constant with radiation to her hips/back. During this time she has also become very weak, developed dyspnea on exertion, and lost about 30lbs unintentionally. Patient denies any fevers, chills, nausea, vomiting, palpitations, or numbness/tingling. She does endorse constipation. All other ROS negative.     In the ED, vitals were /81, , RR 18, T 98.4, SPO2 93% on RA. CT Angio negative for PE, but showed multiple left upper lobe lesions suspicious for malignancy. Also evidence of liver and bone metastatic disease. Admitted to medicine for further management.  (04 Dec 2020 00:06)    PAST MEDICAL & SURGICAL HISTORY:  Depression    Agoraphobia    Anxiety    Emphysema lung    No significant past surgical history      Vital Signs Last 24 Hrs  T(C): 36.1 (05 Dec 2020 08:06), Max: 36.2 (04 Dec 2020 23:47)  T(F): 97 (05 Dec 2020 08:06), Max: 97.2 (05 Dec 2020 03:36)  HR: 91 (05 Dec 2020 08:06) (75 - 122)  BP: 113/70 (05 Dec 2020 08:06) (89/58 - 113/70)  BP(mean): 87 (05 Dec 2020 08:06) (76 - 87)  RR: 18 (05 Dec 2020 08:06) (18 - 18)  SpO2: --                        9.9    8.87  )-----------( 407      ( 05 Dec 2020 08:18 )             32.4     12-05    136  |  96<L>  |  10  ----------------------------<  95  4.1   |  27  |  <0.5<L>    Ca    9.0      05 Dec 2020 08:18  Phos  3.3     12-03  Mg     1.9     12-05    TPro  6.5  /  Alb  3.5  /  TBili  <0.2  /  DBili  x   /  AST  22  /  ALT  12  /  AlkPhos  103  12-05    CARDIAC MARKERS ( 03 Dec 2020 18:56 )  x     / <0.01 ng/mL / x     / x     / x          Urinalysis Basic - ( 04 Dec 2020 00:45 )    Color: Light Yellow / Appearance: Clear / SG: >1.050 / pH: x  Gluc: x / Ketone: Trace  / Bili: Negative / Urobili: <2 mg/dL   Blood: x / Protein: Trace / Nitrite: Negative   Leuk Esterase: Negative / RBC: x / WBC x   Sq Epi: x / Non Sq Epi: x / Bacteria: x        Culture - Urine (collected 04 Dec 2020 00:45)  Source: .Urine Clean Catch (Midstream)  Final Report (05 Dec 2020 03:52):    <10,000 CFU/mL Normal Urogenital Kimmie    Culture - Blood (collected 04 Dec 2020 00:15)  Source: .Blood Blood-Peripheral  Preliminary Report (05 Dec 2020 08:00):    No growth to date.        MEDICATIONS  (STANDING):  ALPRAZolam 0.25 milliGRAM(s) Oral every 6 hours  azithromycin  IVPB      azithromycin  IVPB 500 milliGRAM(s) IV Intermittent every 24 hours  budesonide 160 MICROgram(s)/formoterol 4.5 MICROgram(s) Inhaler 2 Puff(s) Inhalation two times a day  cefTRIAXone   IVPB 1000 milliGRAM(s) IV Intermittent every 24 hours  chlorhexidine 4% Liquid 1 Application(s) Topical <User Schedule>  dexAMETHasone  Injectable 4 milliGRAM(s) IV Push every 12 hours  enoxaparin Injectable 40 milliGRAM(s) SubCutaneous daily  influenza   Vaccine 0.5 milliLiter(s) IntraMuscular once  metoprolol tartrate 12.5 milliGRAM(s) Oral two times a day  sertraline 125 milliGRAM(s) Oral daily     Patient is a 65y old  Female who presents with a chief complaint of abdominal pain (05 Dec 2020 12:31)    HPI:  66 y/o female with PMH Asthma, emphysema (not on home O2), anxiety/depression presents to [facility] with worsening abdominal pain. Patient states her pain first began about 3 months ago. It initially presented itself as a sharp intermittent pain behind the navel, but over time grew to be constant with radiation to her hips/back. During this time she has also become very weak, developed dyspnea on exertion, and lost about 30lbs unintentionally. Patient denies any fevers, chills, nausea, vomiting, palpitations, or numbness/tingling. She does endorse constipation. All other ROS negative.     In the ED, vitals were /81, , RR 18, T 98.4, SPO2 93% on RA. CT Angio negative for PE, but showed multiple left upper lobe lesions suspicious for malignancy. Also evidence of liver and bone metastatic disease. Admitted to medicine for further management.  (04 Dec 2020 00:06)    PAST MEDICAL & SURGICAL HISTORY:  Depression  Agoraphobia  Anxiety  Emphysema lung    patient seen and examined independently on morning rounds for the first time today in the Vent unit, chart reviewed and discussed with the crit care/intensivist    no overnight events---waiting for transfer to [facility] floor (out of the vent unit)--patient refused to go for MRI (thoracic/lumbar spine) yesterday but will try to get MRI done today    Vital Signs Last 24 Hrs  T(C): 36.1 (05 Dec 2020 08:06), Max: 36.2 (04 Dec 2020 23:47)  T(F): 97 (05 Dec 2020 08:06), Max: 97.2 (05 Dec 2020 03:36)  HR: 91 (05 Dec 2020 08:06) (75 - 122)  BP: 113/70 (05 Dec 2020 08:06) (89/58 - 113/70)  BP(mean): 87 (05 Dec 2020 08:06) (76 - 87)  RR: 18 (05 Dec 2020 08:06) (18 - 18)  SpO2: --             PE:  GEN-NAD, lethargic, oob in chair, slumped over sleeping with head in her lap  PULM- Clear to auscultation bilaterally, fair air entry  CVS- +s1/s2 RRR no murmurs  GI- soft NT ND +bs, no rebound, no guarding  EXT- no edema               9.9    8.87  )-----------( 407      ( 05 Dec 2020 08:18 )             32.4     12-05    136  |  96<L>  |  10  ----------------------------<  95  4.1   |  27  |  <0.5<L>    Ca    9.0      05 Dec 2020 08:18  Phos  3.3     12-03  Mg     1.9     12-05    TPro  6.5  /  Alb  3.5  /  TBili  <0.2  /  DBili  x   /  AST  22  /  ALT  12  /  AlkPhos  103  12-05    CARDIAC MARKERS ( 03 Dec 2020 18:56 )  x     / <0.01 ng/mL / x     / x     / x          Urinalysis Basic - ( 04 Dec 2020 00:45 )    Color: Light Yellow / Appearance: Clear / SG: >1.050 / pH: x  Gluc: x / Ketone: Trace  / Bili: Negative / Urobili: <2 mg/dL   Blood: x / Protein: Trace / Nitrite: Negative   Leuk Esterase: Negative / RBC: x / WBC x   Sq Epi: x / Non Sq Epi: x / Bacteria: x        Culture - Urine (collected 04 Dec 2020 00:45)  Source: .Urine Clean Catch (Midstream)  Final Report (05 Dec 2020 03:52):    <10,000 CFU/mL Normal Urogenital Kimmie    Culture - Blood (collected 04 Dec 2020 00:15)  Source: .Blood Blood-Peripheral  Preliminary Report (05 Dec 2020 08:00):    No growth to date.        MEDICATIONS  (STANDING):  ALPRAZolam 0.25 milliGRAM(s) Oral every 6 hours  azithromycin  IVPB      azithromycin  IVPB 500 milliGRAM(s) IV Intermittent every 24 hours  budesonide 160 MICROgram(s)/formoterol 4.5 MICROgram(s) Inhaler 2 Puff(s) Inhalation two times a day  cefTRIAXone   IVPB 1000 milliGRAM(s) IV Intermittent every 24 hours  chlorhexidine 4% Liquid 1 Application(s) Topical <User Schedule>  dexAMETHasone  Injectable 4 milliGRAM(s) IV Push every 12 hours  enoxaparin Injectable 40 milliGRAM(s) SubCutaneous daily  influenza   Vaccine 0.5 milliLiter(s) IntraMuscular once  metoprolol tartrate 12.5 milliGRAM(s) Oral two times a day  sertraline 125 milliGRAM(s) Oral daily

## 2020-12-06 LAB
ANION GAP SERPL CALC-SCNC: 12 MMOL/L — SIGNIFICANT CHANGE UP (ref 7–14)
BASOPHILS # BLD AUTO: 0.01 K/UL — SIGNIFICANT CHANGE UP (ref 0–0.2)
BASOPHILS NFR BLD AUTO: 0.2 % — SIGNIFICANT CHANGE UP (ref 0–1)
BUN SERPL-MCNC: 10 MG/DL — SIGNIFICANT CHANGE UP (ref 10–20)
CALCIUM SERPL-MCNC: 8.2 MG/DL — LOW (ref 8.5–10.1)
CHLORIDE SERPL-SCNC: 99 MMOL/L — SIGNIFICANT CHANGE UP (ref 98–110)
CO2 SERPL-SCNC: 24 MMOL/L — SIGNIFICANT CHANGE UP (ref 17–32)
CREAT SERPL-MCNC: <0.5 MG/DL — LOW (ref 0.7–1.5)
EOSINOPHIL # BLD AUTO: 0 K/UL — SIGNIFICANT CHANGE UP (ref 0–0.7)
EOSINOPHIL NFR BLD AUTO: 0 % — SIGNIFICANT CHANGE UP (ref 0–8)
GLUCOSE SERPL-MCNC: 138 MG/DL — HIGH (ref 70–99)
HCT VFR BLD CALC: 32 % — LOW (ref 37–47)
HGB BLD-MCNC: 9.5 G/DL — LOW (ref 12–16)
IMM GRANULOCYTES NFR BLD AUTO: 0.3 % — SIGNIFICANT CHANGE UP (ref 0.1–0.3)
LYMPHOCYTES # BLD AUTO: 0.54 K/UL — LOW (ref 1.2–3.4)
LYMPHOCYTES # BLD AUTO: 8.2 % — LOW (ref 20.5–51.1)
MAGNESIUM SERPL-MCNC: 1.8 MG/DL — SIGNIFICANT CHANGE UP (ref 1.8–2.4)
MCHC RBC-ENTMCNC: 29.7 G/DL — LOW (ref 32–37)
MCHC RBC-ENTMCNC: 31.7 PG — HIGH (ref 27–31)
MCV RBC AUTO: 106.7 FL — HIGH (ref 81–99)
MONOCYTES # BLD AUTO: 0.31 K/UL — SIGNIFICANT CHANGE UP (ref 0.1–0.6)
MONOCYTES NFR BLD AUTO: 4.7 % — SIGNIFICANT CHANGE UP (ref 1.7–9.3)
NEUTROPHILS # BLD AUTO: 5.68 K/UL — SIGNIFICANT CHANGE UP (ref 1.4–6.5)
NEUTROPHILS NFR BLD AUTO: 86.6 % — HIGH (ref 42.2–75.2)
NRBC # BLD: 0 /100 WBCS — SIGNIFICANT CHANGE UP (ref 0–0)
PLATELET # BLD AUTO: 370 K/UL — SIGNIFICANT CHANGE UP (ref 130–400)
POTASSIUM SERPL-MCNC: 5.3 MMOL/L — HIGH (ref 3.5–5)
POTASSIUM SERPL-SCNC: 5.3 MMOL/L — HIGH (ref 3.5–5)
RBC # BLD: 3 M/UL — LOW (ref 4.2–5.4)
RBC # FLD: 14.4 % — SIGNIFICANT CHANGE UP (ref 11.5–14.5)
SODIUM SERPL-SCNC: 135 MMOL/L — SIGNIFICANT CHANGE UP (ref 135–146)
WBC # BLD: 6.56 K/UL — SIGNIFICANT CHANGE UP (ref 4.8–10.8)
WBC # FLD AUTO: 6.56 K/UL — SIGNIFICANT CHANGE UP (ref 4.8–10.8)

## 2020-12-06 RX ADMIN — MORPHINE SULFATE 2 MILLIGRAM(S): 50 CAPSULE, EXTENDED RELEASE ORAL at 17:32

## 2020-12-06 RX ADMIN — MORPHINE SULFATE 2 MILLIGRAM(S): 50 CAPSULE, EXTENDED RELEASE ORAL at 19:13

## 2020-12-06 RX ADMIN — MORPHINE SULFATE 2 MILLIGRAM(S): 50 CAPSULE, EXTENDED RELEASE ORAL at 12:24

## 2020-12-06 RX ADMIN — BUDESONIDE AND FORMOTEROL FUMARATE DIHYDRATE 2 PUFF(S): 160; 4.5 AEROSOL RESPIRATORY (INHALATION) at 07:14

## 2020-12-06 RX ADMIN — AZITHROMYCIN 255 MILLIGRAM(S): 500 TABLET, FILM COATED ORAL at 08:04

## 2020-12-06 RX ADMIN — SERTRALINE 125 MILLIGRAM(S): 25 TABLET, FILM COATED ORAL at 11:00

## 2020-12-06 RX ADMIN — Medication 0.25 MILLIGRAM(S): at 13:02

## 2020-12-06 RX ADMIN — Medication 4 MILLIGRAM(S): at 05:14

## 2020-12-06 RX ADMIN — Medication 12.5 MILLIGRAM(S): at 05:15

## 2020-12-06 RX ADMIN — Medication 0.25 MILLIGRAM(S): at 21:34

## 2020-12-06 RX ADMIN — CEFTRIAXONE 100 MILLIGRAM(S): 500 INJECTION, POWDER, FOR SOLUTION INTRAMUSCULAR; INTRAVENOUS at 14:07

## 2020-12-06 RX ADMIN — CHLORHEXIDINE GLUCONATE 1 APPLICATION(S): 213 SOLUTION TOPICAL at 05:14

## 2020-12-06 RX ADMIN — Medication 4 MILLIGRAM(S): at 17:23

## 2020-12-06 RX ADMIN — ENOXAPARIN SODIUM 40 MILLIGRAM(S): 100 INJECTION SUBCUTANEOUS at 11:01

## 2020-12-06 RX ADMIN — Medication 0.25 MILLIGRAM(S): at 16:38

## 2020-12-06 RX ADMIN — Medication 12.5 MILLIGRAM(S): at 17:23

## 2020-12-06 RX ADMIN — Medication 0.25 MILLIGRAM(S): at 09:42

## 2020-12-06 RX ADMIN — BUDESONIDE AND FORMOTEROL FUMARATE DIHYDRATE 2 PUFF(S): 160; 4.5 AEROSOL RESPIRATORY (INHALATION) at 21:35

## 2020-12-06 NOTE — PROGRESS NOTE ADULT - ASSESSMENT
SRINIVASA ESTRADA 65y Female  MRN#: [id number]   CODE STATUS:________      SUBJECTIVE  Patient is a 65y old Female who presents with a chief complaint of abdominal pain (05 Dec 2020 12:31)  Currently admitted to medicine with the primary diagnosis of Respiratory distress    Hospital course has been complicated by _______.   Today is hospital day 3d, and this morning she is _________ and reports ________ overnight events.     Present Today:           Asif Catheter ()No/ ()Yes? Indication:          Central Line ()No/ ()Yes? Indication:          IV Fluids ()No/ ()Yes? Type:  Rate:  Indication:      OBJECTIVE  PAST MEDICAL & SURGICAL HISTORY  Depression    Agoraphobia    Anxiety    Emphysema lung    No significant past surgical history      ALLERGIES:  penicillins (Unknown)    MEDICATIONS:  STANDING MEDICATIONS  ALPRAZolam 0.25 milliGRAM(s) Oral every 6 hours  azithromycin  IVPB      azithromycin  IVPB 500 milliGRAM(s) IV Intermittent every 24 hours  budesonide 160 MICROgram(s)/formoterol 4.5 MICROgram(s) Inhaler 2 Puff(s) Inhalation two times a day  cefTRIAXone   IVPB 1000 milliGRAM(s) IV Intermittent every 24 hours  chlorhexidine 4% Liquid 1 Application(s) Topical <User Schedule>  dexAMETHasone  Injectable 4 milliGRAM(s) IV Push every 12 hours  enoxaparin Injectable 40 milliGRAM(s) SubCutaneous daily  influenza   Vaccine 0.5 milliLiter(s) IntraMuscular once  metoprolol tartrate 12.5 milliGRAM(s) Oral two times a day  sertraline 125 milliGRAM(s) Oral daily    PRN MEDICATIONS  ALPRAZolam 0.25 milliGRAM(s) Oral every 12 hours PRN  morphine  - Injectable 2 milliGRAM(s) IV Push every 4 hours PRN      VITAL SIGNS: Last 24 Hours  T(C): 36.7 (06 Dec 2020 08:34), Max: 36.7 (06 Dec 2020 08:34)  T(F): 98.1 (06 Dec 2020 08:34), Max: 98.1 (06 Dec 2020 08:34)  HR: 82 (06 Dec 2020 08:34) (82 - 112)  BP: 100/61 (06 Dec 2020 08:34) (100/61 - 135/80)  BP(mean): 76 (06 Dec 2020 08:34) (75 - 95)  RR: 16 (06 Dec 2020 08:34) (16 - 20)  SpO2: 97% (06 Dec 2020 08:34) (97% - 100%)    LABS:                        9.5    6.56  )-----------( 370      ( 06 Dec 2020 08:38 )             32.0     12-06    135  |  99  |  10  ----------------------------<  138<H>  5.3<H>   |  24  |  <0.5<L>    Ca    8.2<L>      06 Dec 2020 08:38  Mg     1.8     12-06    TPro  6.5  /  Alb  3.5  /  TBili  <0.2  /  DBili  x   /  AST  22  /  ALT  12  /  AlkPhos  103  12-05              Culture - Urine (collected 04 Dec 2020 00:45)  Source: .Urine Clean Catch (Midstream)  Final Report (05 Dec 2020 03:52):    <10,000 CFU/mL Normal Urogenital Kimmie    Culture - Blood (collected 04 Dec 2020 00:15)  Source: .Blood Blood-Peripheral  Preliminary Report (05 Dec 2020 08:00):    No growth to date.          RADIOLOGY:      PHYSICAL EXAM:    GEN-NAD, lethargic, oob in chair, slumped over sleeping with head in her lap  PULM- Clear to auscultation bilaterally, fair air entry  CVS- +s1/s2 RRR no murmurs  GI- soft NT ND +bs, no rebound, no guarding  EXT- no edema      ASSESSMENT & PLAN        #abdominal pain- suspected metastatic cancer (unknown primary)  -incidental finding on CTA (Left upper lobe lung lesion and multiple bone/liver mets?)  -malignancy w/u---awaiting MRI Thoracic/lumbar spine (will attempt to get done today- yesterday she refused)  -neurosurgery following--remains on dexa 4 mg iv q12 hr  -pain control- morphine  -needs IR biopsy after MRI  -heme/onc recomm  -palliative care following  -awaiting transfer to regular medical floor once bed available    #DVT/GI ppx  FULL CODE    guarded prognosis--awaiting transfer to regular medical floor out of vent unit SRINIVASA ESTRADA 65y Female  MRN#: [id number]   CODE STATUS:full code      SUBJECTIVE  Patient is a 65y old Female who presents with a chief complaint of abdominal pain (05 Dec 2020 12:31)  Currently admitted to medicine with the primary diagnosis of Respiratory distress  Today is hospital day 3d, and this morning she is resting  in bed and reports no overnight events.     OBJECTIVE  PAST MEDICAL & SURGICAL HISTORY  Depression    Agoraphobia    Anxiety    Emphysema lung    No significant past surgical history      ALLERGIES:  penicillins (Unknown)    MEDICATIONS:  STANDING MEDICATIONS  ALPRAZolam 0.25 milliGRAM(s) Oral every 6 hours  azithromycin  IVPB      azithromycin  IVPB 500 milliGRAM(s) IV Intermittent every 24 hours  budesonide 160 MICROgram(s)/formoterol 4.5 MICROgram(s) Inhaler 2 Puff(s) Inhalation two times a day  cefTRIAXone   IVPB 1000 milliGRAM(s) IV Intermittent every 24 hours  chlorhexidine 4% Liquid 1 Application(s) Topical <User Schedule>  dexAMETHasone  Injectable 4 milliGRAM(s) IV Push every 12 hours  enoxaparin Injectable 40 milliGRAM(s) SubCutaneous daily  influenza   Vaccine 0.5 milliLiter(s) IntraMuscular once  metoprolol tartrate 12.5 milliGRAM(s) Oral two times a day  sertraline 125 milliGRAM(s) Oral daily    PRN MEDICATIONS  ALPRAZolam 0.25 milliGRAM(s) Oral every 12 hours PRN  morphine  - Injectable 2 milliGRAM(s) IV Push every 4 hours PRN      VITAL SIGNS: Last 24 Hours  T(C): 36.7 (06 Dec 2020 08:34), Max: 36.7 (06 Dec 2020 08:34)  T(F): 98.1 (06 Dec 2020 08:34), Max: 98.1 (06 Dec 2020 08:34)  HR: 82 (06 Dec 2020 08:34) (82 - 112)  BP: 100/61 (06 Dec 2020 08:34) (100/61 - 135/80)  BP(mean): 76 (06 Dec 2020 08:34) (75 - 95)  RR: 16 (06 Dec 2020 08:34) (16 - 20)  SpO2: 97% (06 Dec 2020 08:34) (97% - 100%)    LABS:                        9.5    6.56  )-----------( 370      ( 06 Dec 2020 08:38 )             32.0     12-06    135  |  99  |  10  ----------------------------<  138<H>  5.3<H>   |  24  |  <0.5<L>    Ca    8.2<L>      06 Dec 2020 08:38  Mg     1.8     12-06    TPro  6.5  /  Alb  3.5  /  TBili  <0.2  /  DBili  x   /  AST  22  /  ALT  12  /  AlkPhos  103  12-05              Culture - Urine (collected 04 Dec 2020 00:45)  Source: .Urine Clean Catch (Midstream)  Final Report (05 Dec 2020 03:52):    <10,000 CFU/mL Normal Urogenital Kimmie    Culture - Blood (collected 04 Dec 2020 00:15)  Source: .Blood Blood-Peripheral  Preliminary Report (05 Dec 2020 08:00):    No growth to date.    PHYSICAL EXAM:    GEN-NAD, lethargic, oob in chair, slumped over sleeping with head in her lap  PULM- Clear to auscultation bilaterally, fair air entry  CVS- +s1/s2 RRR no murmurs  GI- soft NT ND +bs, no rebound, no guarding  EXT- no edema      ASSESSMENT & PLAN        #abdominal pain- suspected metastatic cancer (unknown primary)  -incidental finding on CTA (Left upper lobe lung lesion and multiple bone/liver mets?)  -malignancy w/u---awaiting MRI Thoracic/lumbar spine (will attempt to get done today- yesterday she refused)  -neurosurgery following--remains on dexa 4 mg iv q12 hr  -pain control- morphine  -needs IR biopsy after MRI  - f/u MRI  -heme/onc recomm  -palliative care following  -awaiting transfer to regular medical floor once bed available  - c/w Rocephin and azithromycin for possible superimposed infection     #DVT/GI ppx  FULL CODE    guarded prognosis-    -awaiting transfer to regular medical floor out of vent unit

## 2020-12-07 LAB
ANION GAP SERPL CALC-SCNC: 11 MMOL/L — SIGNIFICANT CHANGE UP (ref 7–14)
BUN SERPL-MCNC: 14 MG/DL — SIGNIFICANT CHANGE UP (ref 10–20)
CALCIUM SERPL-MCNC: 9.2 MG/DL — SIGNIFICANT CHANGE UP (ref 8.5–10.1)
CHLORIDE SERPL-SCNC: 94 MMOL/L — LOW (ref 98–110)
CO2 SERPL-SCNC: 31 MMOL/L — SIGNIFICANT CHANGE UP (ref 17–32)
CREAT SERPL-MCNC: <0.5 MG/DL — LOW (ref 0.7–1.5)
GLUCOSE SERPL-MCNC: 103 MG/DL — HIGH (ref 70–99)
POTASSIUM SERPL-MCNC: 4.8 MMOL/L — SIGNIFICANT CHANGE UP (ref 3.5–5)
POTASSIUM SERPL-SCNC: 4.8 MMOL/L — SIGNIFICANT CHANGE UP (ref 3.5–5)
SODIUM SERPL-SCNC: 136 MMOL/L — SIGNIFICANT CHANGE UP (ref 135–146)

## 2020-12-07 PROCEDURE — 99232 SBSQ HOSP IP/OBS MODERATE 35: CPT

## 2020-12-07 PROCEDURE — 99233 SBSQ HOSP IP/OBS HIGH 50: CPT

## 2020-12-07 PROCEDURE — 99497 ADVNCD CARE PLAN 30 MIN: CPT | Mod: 25

## 2020-12-07 PROCEDURE — 71045 X-RAY EXAM CHEST 1 VIEW: CPT | Mod: 26

## 2020-12-07 RX ORDER — MORPHINE SULFATE 50 MG/1
7.5 CAPSULE, EXTENDED RELEASE ORAL
Refills: 0 | Status: DISCONTINUED | OUTPATIENT
Start: 2020-12-07 | End: 2020-12-10

## 2020-12-07 RX ADMIN — Medication 4 MILLIGRAM(S): at 05:52

## 2020-12-07 RX ADMIN — Medication 0.25 MILLIGRAM(S): at 13:06

## 2020-12-07 RX ADMIN — BUDESONIDE AND FORMOTEROL FUMARATE DIHYDRATE 2 PUFF(S): 160; 4.5 AEROSOL RESPIRATORY (INHALATION) at 20:04

## 2020-12-07 RX ADMIN — MORPHINE SULFATE 2 MILLIGRAM(S): 50 CAPSULE, EXTENDED RELEASE ORAL at 05:50

## 2020-12-07 RX ADMIN — Medication 12.5 MILLIGRAM(S): at 17:13

## 2020-12-07 RX ADMIN — Medication 4 MILLIGRAM(S): at 17:13

## 2020-12-07 RX ADMIN — Medication 0.25 MILLIGRAM(S): at 02:25

## 2020-12-07 RX ADMIN — MORPHINE SULFATE 7.5 MILLIGRAM(S): 50 CAPSULE, EXTENDED RELEASE ORAL at 20:03

## 2020-12-07 RX ADMIN — AZITHROMYCIN 255 MILLIGRAM(S): 500 TABLET, FILM COATED ORAL at 11:22

## 2020-12-07 RX ADMIN — CHLORHEXIDINE GLUCONATE 1 APPLICATION(S): 213 SOLUTION TOPICAL at 05:52

## 2020-12-07 RX ADMIN — BUDESONIDE AND FORMOTEROL FUMARATE DIHYDRATE 2 PUFF(S): 160; 4.5 AEROSOL RESPIRATORY (INHALATION) at 11:45

## 2020-12-07 RX ADMIN — Medication 0.25 MILLIGRAM(S): at 20:03

## 2020-12-07 RX ADMIN — SERTRALINE 125 MILLIGRAM(S): 25 TABLET, FILM COATED ORAL at 11:44

## 2020-12-07 RX ADMIN — ENOXAPARIN SODIUM 40 MILLIGRAM(S): 100 INJECTION SUBCUTANEOUS at 11:44

## 2020-12-07 RX ADMIN — CEFTRIAXONE 100 MILLIGRAM(S): 500 INJECTION, POWDER, FOR SOLUTION INTRAMUSCULAR; INTRAVENOUS at 14:38

## 2020-12-07 NOTE — CHART NOTE - NSCHARTNOTEFT_GEN_A_CORE
Palliative care team met with the patient at bedside to review symptoms. Patient presents very anxious and tearful.  Patient remains adamant she would like to go home and does not wish to remain hospitalized any longer. Patient relating she is afraid to initiate new pain regimen as she feels it will keep her hospitalized longer. Patient also relating she would be amenable to hospice care and her only request is to leave the hospital.   LMSW contacted patient's HCP/caregiver Zehra Arroyo. Zehra relating she understands the patient's struggle and would want het to pursue getting a work up, however, also understands patient's anxiety and wish to return home. Zehra states patient is unable to return home as she lives alone and would require 24x7 help. Hospice introduced; Zehra is amenable and would like to speak with them for further planning.   Hospice consult placed. Current plan is ongoing medical management. Palliative care team will remain available as appropriate.

## 2020-12-07 NOTE — PROGRESS NOTE ADULT - SUBJECTIVE AND OBJECTIVE BOX
Hospital Day:  4d    Subjective:    Patient is a 65y old  Female who presents with a chief complaint of abdominal pain (06 Dec 2020 12:48)      Admitted to medicine for a primary diagnosis of     Past Medical Hx:   Depression    Agoraphobia    Anxiety    Emphysema lung      Past Sx:  No significant past surgical history      Allergies:  penicillins (Unknown)    Current Meds:   Standng Meds:  ALPRAZolam 0.25 milliGRAM(s) Oral every 6 hours  azithromycin  IVPB      azithromycin  IVPB 500 milliGRAM(s) IV Intermittent every 24 hours  budesonide 160 MICROgram(s)/formoterol 4.5 MICROgram(s) Inhaler 2 Puff(s) Inhalation two times a day  cefTRIAXone   IVPB 1000 milliGRAM(s) IV Intermittent every 24 hours  chlorhexidine 4% Liquid 1 Application(s) Topical <User Schedule>  dexAMETHasone  Injectable 4 milliGRAM(s) IV Push every 12 hours  enoxaparin Injectable 40 milliGRAM(s) SubCutaneous daily  influenza   Vaccine 0.5 milliLiter(s) IntraMuscular once  metoprolol tartrate 12.5 milliGRAM(s) Oral two times a day  sertraline 125 milliGRAM(s) Oral daily    PRN Meds:  ALPRAZolam 0.25 milliGRAM(s) Oral every 12 hours PRN anxiety  morphine  - Injectable 2 milliGRAM(s) IV Push every 4 hours PRN Severe Pain (7 - 10)    HOME MEDICATIONS:  Advair Diskus 250 mcg-50 mcg inhalation powder: 1 puff(s) inhaled 2 times a day  ALPRAZolam 0.5 mg oral tablet: 0.5 tab(s) orally 3 times a day  busPIRone 10 mg oral tablet: 1 tab(s) orally 2 times a day  sertraline 100 mg oral tablet: 1 tab(s) orally once a day      Vital Signs:   T(F): 97.5 (12-07-20 @ 04:33), Max: 98.1 (12-06-20 @ 08:34)  HR: 89 (12-07-20 @ 06:49) (82 - 110)  BP: 101/62 (12-07-20 @ 06:49) (83/57 - 109/72)  RR: 18 (12-07-20 @ 04:33) (16 - 18)  SpO2: 97% (12-06-20 @ 08:34) (97% - 97%)        Physical Exam:   GENERAL: NAD  HEENT: NCAT  CHEST/LUNG: CTAB  HEART: Regular rate and rhythm; s1 s2 appreciated, No murmurs, rubs, or gallops  ABDOMEN: Soft, Nontender, Nondistended; Bowel sounds present  EXTREMITIES: No LE edema b/l  SKIN: no rashes, no new lesions  NERVOUS SYSTEM:  Alert & Oriented X3  LINES/CATHETERS:        Labs:                         9.5    6.56  )-----------( 370      ( 06 Dec 2020 08:38 )             32.0     Neutophil% 86.6, Lymphocyte% 8.2, Monocyte% 4.7, Bands% 0.3 12-06-20 @ 08:38    06 Dec 2020 08:38    135    |  99     |  10     ----------------------------<  138    5.3     |  24     |  <0.5     Ca    8.2        06 Dec 2020 08:38  Mg     1.8       06 Dec 2020 08:38    TPro  6.5    /  Alb  3.5    /  TBili  <0.2   /  DBili  x      /  AST  22     /  ALT  12     /  AlkPhos  103    05 Dec 2020 08:18        Amylase --, Lipase 23, 12-03-20 @ 18:56      Serum Pro-Brain Natriuretic Peptide: 223 pg/mL (12-03-20 @ 18:56)    Troponin <0.01, CKMB --, CK -- 12-03-20 @ 18:56        Urinalysis Basic - ( 04 Dec 2020 00:45 )    Color: Light Yellow / Appearance: Clear / SG: >1.050 / pH: x  Gluc: x / Ketone: Trace  / Bili: Negative / Urobili: <2 mg/dL   Blood: x / Protein: Trace / Nitrite: Negative   Leuk Esterase: Negative / RBC: x / WBC x   Sq Epi: x / Non Sq Epi: x / Bacteria: x          Culture - Blood (collected 12-04-20 @ 00:15)  Source: .Blood Blood-Peripheral  Preliminary Report (12-05-20 @ 08:00):    No growth to date.         Hospital Day:  4d    Subjective:    Patient is a 65y old  Female who presents with a chief complaint of abdominal pain. No acute events overnight. In no distress this am.       Admitted to medicine for a primary diagnosis of     Past Medical Hx:   Depression    Agoraphobia    Anxiety    Emphysema lung      Past Sx:  No significant past surgical history      Allergies:  penicillins (Unknown)    Current Meds:   Standng Meds:  ALPRAZolam 0.25 milliGRAM(s) Oral every 6 hours  azithromycin  IVPB      azithromycin  IVPB 500 milliGRAM(s) IV Intermittent every 24 hours  budesonide 160 MICROgram(s)/formoterol 4.5 MICROgram(s) Inhaler 2 Puff(s) Inhalation two times a day  cefTRIAXone   IVPB 1000 milliGRAM(s) IV Intermittent every 24 hours  chlorhexidine 4% Liquid 1 Application(s) Topical <User Schedule>  dexAMETHasone  Injectable 4 milliGRAM(s) IV Push every 12 hours  enoxaparin Injectable 40 milliGRAM(s) SubCutaneous daily  influenza   Vaccine 0.5 milliLiter(s) IntraMuscular once  metoprolol tartrate 12.5 milliGRAM(s) Oral two times a day  sertraline 125 milliGRAM(s) Oral daily    PRN Meds:  ALPRAZolam 0.25 milliGRAM(s) Oral every 12 hours PRN anxiety  morphine  - Injectable 2 milliGRAM(s) IV Push every 4 hours PRN Severe Pain (7 - 10)    HOME MEDICATIONS:  Advair Diskus 250 mcg-50 mcg inhalation powder: 1 puff(s) inhaled 2 times a day  ALPRAZolam 0.5 mg oral tablet: 0.5 tab(s) orally 3 times a day  busPIRone 10 mg oral tablet: 1 tab(s) orally 2 times a day  sertraline 100 mg oral tablet: 1 tab(s) orally once a day      Vital Signs:   T(F): 97.5 (12-07-20 @ 04:33), Max: 98.1 (12-06-20 @ 08:34)  HR: 89 (12-07-20 @ 06:49) (82 - 110)  BP: 101/62 (12-07-20 @ 06:49) (83/57 - 109/72)  RR: 18 (12-07-20 @ 04:33) (16 - 18)  SpO2: 97% (12-06-20 @ 08:34) (97% - 97%)        Physical Exam:   GENERAL: NAD  HEENT: NCAT  CHEST/LUNG: CTAB  HEART: Regular rate and rhythm; s1 s2 appreciated, No murmurs, rubs, or gallops  ABDOMEN: Soft, Nontender, Nondistended; Bowel sounds present  EXTREMITIES: No LE edema b/l  SKIN: no rashes, no new lesions  NERVOUS SYSTEM:  Alert & Oriented X3  LINES/CATHETERS:        Labs:                         9.5    6.56  )-----------( 370      ( 06 Dec 2020 08:38 )             32.0     Neutophil% 86.6, Lymphocyte% 8.2, Monocyte% 4.7, Bands% 0.3 12-06-20 @ 08:38    06 Dec 2020 08:38    135    |  99     |  10     ----------------------------<  138    5.3     |  24     |  <0.5     Ca    8.2        06 Dec 2020 08:38  Mg     1.8       06 Dec 2020 08:38    TPro  6.5    /  Alb  3.5    /  TBili  <0.2   /  DBili  x      /  AST  22     /  ALT  12     /  AlkPhos  103    05 Dec 2020 08:18        Amylase --, Lipase 23, 12-03-20 @ 18:56      Serum Pro-Brain Natriuretic Peptide: 223 pg/mL (12-03-20 @ 18:56)    Troponin <0.01, CKMB --, CK -- 12-03-20 @ 18:56        Urinalysis Basic - ( 04 Dec 2020 00:45 )    Color: Light Yellow / Appearance: Clear / SG: >1.050 / pH: x  Gluc: x / Ketone: Trace  / Bili: Negative / Urobili: <2 mg/dL   Blood: x / Protein: Trace / Nitrite: Negative   Leuk Esterase: Negative / RBC: x / WBC x   Sq Epi: x / Non Sq Epi: x / Bacteria: x          Culture - Blood (collected 12-04-20 @ 00:15)  Source: .Blood Blood-Peripheral  Preliminary Report (12-05-20 @ 08:00):    No growth to date.         Hospital Day:  4d    Subjective:    Patient is a 65y old  Female who presents with a chief complaint of abdominal pain. No acute events overnight. In no distress this am.       Admitted to medicine for a primary diagnosis of Abd pain     Past Medical Hx:   Depression    Agoraphobia    Anxiety    Emphysema lung      Past Sx:  No significant past surgical history      Allergies:  penicillins (Unknown)    Current Meds:   Standng Meds:  ALPRAZolam 0.25 milliGRAM(s) Oral every 6 hours  azithromycin  IVPB      azithromycin  IVPB 500 milliGRAM(s) IV Intermittent every 24 hours  budesonide 160 MICROgram(s)/formoterol 4.5 MICROgram(s) Inhaler 2 Puff(s) Inhalation two times a day  cefTRIAXone   IVPB 1000 milliGRAM(s) IV Intermittent every 24 hours  chlorhexidine 4% Liquid 1 Application(s) Topical <User Schedule>  dexAMETHasone  Injectable 4 milliGRAM(s) IV Push every 12 hours  enoxaparin Injectable 40 milliGRAM(s) SubCutaneous daily  influenza   Vaccine 0.5 milliLiter(s) IntraMuscular once  metoprolol tartrate 12.5 milliGRAM(s) Oral two times a day  sertraline 125 milliGRAM(s) Oral daily    PRN Meds:  ALPRAZolam 0.25 milliGRAM(s) Oral every 12 hours PRN anxiety  morphine  - Injectable 2 milliGRAM(s) IV Push every 4 hours PRN Severe Pain (7 - 10)    HOME MEDICATIONS:  Advair Diskus 250 mcg-50 mcg inhalation powder: 1 puff(s) inhaled 2 times a day  ALPRAZolam 0.5 mg oral tablet: 0.5 tab(s) orally 3 times a day  busPIRone 10 mg oral tablet: 1 tab(s) orally 2 times a day  sertraline 100 mg oral tablet: 1 tab(s) orally once a day      Vital Signs:   T(F): 97.5 (12-07-20 @ 04:33), Max: 98.1 (12-06-20 @ 08:34)  HR: 89 (12-07-20 @ 06:49) (82 - 110)  BP: 101/62 (12-07-20 @ 06:49) (83/57 - 109/72)  RR: 18 (12-07-20 @ 04:33) (16 - 18)  SpO2: 97% (12-06-20 @ 08:34) (97% - 97%)        Physical Exam:   GENERAL: NAD  HEENT: NCAT  CHEST/LUNG: CTAB  HEART: Regular rate and rhythm; s1 s2 appreciated, No murmurs, rubs, or gallops  ABDOMEN: Soft, Nontender, Nondistended; Bowel sounds present  EXTREMITIES: No LE edema b/l  SKIN: no rashes, no new lesions  NERVOUS SYSTEM:  Alert & Oriented X3  LINES/CATHETERS:        Labs:                         9.5    6.56  )-----------( 370      ( 06 Dec 2020 08:38 )             32.0     Neutophil% 86.6, Lymphocyte% 8.2, Monocyte% 4.7, Bands% 0.3 12-06-20 @ 08:38    06 Dec 2020 08:38    135    |  99     |  10     ----------------------------<  138    5.3     |  24     |  <0.5     Ca    8.2        06 Dec 2020 08:38  Mg     1.8       06 Dec 2020 08:38    TPro  6.5    /  Alb  3.5    /  TBili  <0.2   /  DBili  x      /  AST  22     /  ALT  12     /  AlkPhos  103    05 Dec 2020 08:18        Amylase --, Lipase 23, 12-03-20 @ 18:56      Serum Pro-Brain Natriuretic Peptide: 223 pg/mL (12-03-20 @ 18:56)    Troponin <0.01, CKMB --, CK -- 12-03-20 @ 18:56        Urinalysis Basic - ( 04 Dec 2020 00:45 )    Color: Light Yellow / Appearance: Clear / SG: >1.050 / pH: x  Gluc: x / Ketone: Trace  / Bili: Negative / Urobili: <2 mg/dL   Blood: x / Protein: Trace / Nitrite: Negative   Leuk Esterase: Negative / RBC: x / WBC x   Sq Epi: x / Non Sq Epi: x / Bacteria: x          Culture - Blood (collected 12-04-20 @ 00:15)  Source: .Blood Blood-Peripheral  Preliminary Report (12-05-20 @ 08:00):    No growth to date.         Hospital Day:  4d    Subjective:    Patient is a 65y old  Female who presents with a chief complaint of abdominal pain. No acute events overnight. In no distress this am.       Admitted to medicine for a primary diagnosis of Abd pain     Past Medical Hx:   Depression    Agoraphobia    Anxiety    Emphysema lung      Past Sx:  No significant past surgical history      Allergies:  penicillins (Unknown)    Current Meds:   Standng Meds:  ALPRAZolam 0.25 milliGRAM(s) Oral every 6 hours  azithromycin  IVPB      azithromycin  IVPB 500 milliGRAM(s) IV Intermittent every 24 hours  budesonide 160 MICROgram(s)/formoterol 4.5 MICROgram(s) Inhaler 2 Puff(s) Inhalation two times a day  cefTRIAXone   IVPB 1000 milliGRAM(s) IV Intermittent every 24 hours  chlorhexidine 4% Liquid 1 Application(s) Topical <User Schedule>  dexAMETHasone  Injectable 4 milliGRAM(s) IV Push every 12 hours  enoxaparin Injectable 40 milliGRAM(s) SubCutaneous daily  influenza   Vaccine 0.5 milliLiter(s) IntraMuscular once  metoprolol tartrate 12.5 milliGRAM(s) Oral two times a day  sertraline 125 milliGRAM(s) Oral daily    PRN Meds:  ALPRAZolam 0.25 milliGRAM(s) Oral every 12 hours PRN anxiety  morphine  - Injectable 2 milliGRAM(s) IV Push every 4 hours PRN Severe Pain (7 - 10)    HOME MEDICATIONS:  Advair Diskus 250 mcg-50 mcg inhalation powder: 1 puff(s) inhaled 2 times a day  ALPRAZolam 0.5 mg oral tablet: 0.5 tab(s) orally 3 times a day  busPIRone 10 mg oral tablet: 1 tab(s) orally 2 times a day  sertraline 100 mg oral tablet: 1 tab(s) orally once a day      Vital Signs:   T(F): 97.5 (12-07-20 @ 04:33), Max: 98.1 (12-06-20 @ 08:34)  HR: 89 (12-07-20 @ 06:49) (82 - 110)  BP: 101/62 (12-07-20 @ 06:49) (83/57 - 109/72)  RR: 18 (12-07-20 @ 04:33) (16 - 18)  SpO2: 97% (12-06-20 @ 08:34) (97% - 97%)        Physical Exam:   GENERAL: NAD  HEENT: NCAT  CHEST/LUNG: CTAB  HEART: Regular rate and rhythm; s1 s2 appreciated, No murmurs, rubs, or gallops  ABDOMEN: Soft, Nontender, Nondistended; Bowel sounds present  EXTREMITIES: No LE edema b/l  SKIN: no rashes, no new lesions  NERVOUS SYSTEM:  Alert & Oriented X3      Labs:                         9.5    6.56  )-----------( 370      ( 06 Dec 2020 08:38 )             32.0     Neutophil% 86.6, Lymphocyte% 8.2, Monocyte% 4.7, Bands% 0.3 12-06-20 @ 08:38    06 Dec 2020 08:38    135    |  99     |  10     ----------------------------<  138    5.3     |  24     |  <0.5     Ca    8.2        06 Dec 2020 08:38  Mg     1.8       06 Dec 2020 08:38    TPro  6.5    /  Alb  3.5    /  TBili  <0.2   /  DBili  x      /  AST  22     /  ALT  12     /  AlkPhos  103    05 Dec 2020 08:18        Amylase --, Lipase 23, 12-03-20 @ 18:56      Serum Pro-Brain Natriuretic Peptide: 223 pg/mL (12-03-20 @ 18:56)    Troponin <0.01, CKMB --, CK -- 12-03-20 @ 18:56        Urinalysis Basic - ( 04 Dec 2020 00:45 )    Color: Light Yellow / Appearance: Clear / SG: >1.050 / pH: x  Gluc: x / Ketone: Trace  / Bili: Negative / Urobili: <2 mg/dL   Blood: x / Protein: Trace / Nitrite: Negative   Leuk Esterase: Negative / RBC: x / WBC x   Sq Epi: x / Non Sq Epi: x / Bacteria: x          Culture - Blood (collected 12-04-20 @ 00:15)  Source: .Blood Blood-Peripheral  Preliminary Report (12-05-20 @ 08:00):    No growth to date.

## 2020-12-07 NOTE — PROGRESS NOTE ADULT - ASSESSMENT
65y F with h/o depression/anxiety; agoraphobia; emaphysema/asthma presented to ED with 3m of abdominal pain infrequent bowl movements and 30lbs weight loss x3m. Hospital day 1 and since CT abd shows multiple pulmonary lesions suspicious for malignancy. Multiple findings of bony metastatic disease including the Left iliac bone with a 6.3 cm soft tissue component that displaces the retroperitoneum and left kidney. Acute pathologic compression fracture of the  left L5 vertebral body, with mild endplate depression. Metastatic lytic lesion in L2 vertebral body, also the right L4 posterior elements. Indeterminate 1.8 cm hyperenhancing nodule in the pelvis, may be associated with the mesentery. Neuro surg - no intervention at this time.   Being reevaluated for symptom management and pain: pain controlled; anxiety due to mentail health disorders is being exacerbated by hospitalization.     Patient want to get out of hospital even if it means Hospice; Lolis Steve, Palliative Care SW spoke to Zehra, HCP : both open to Hospice Consult    Morphine Equivalent Daily Dose (MEDD):  18mg    Recommendations:    Continue current medical management  Patient responds to non-pharmacolgical interventions: calm approach; allow time to express needs and address accordingly; avoid restraints; and leave lower two siderails of bed down as they contribute to her agoraphobia  Xanax 0.25mg PO twice day PRN for anxiety; otherwise as per psych recommendations    DC: morphine 2mg IVP q4H PRN for pain  add morphine 7.5mg PO  q3H PRN for pain 1-10    D/W Alanna Baca and Matthew and nursing    Remains Full Code for now - will address when patient is calmer  Please Call [phone] PRN      65y F with h/o depression/anxiety; agoraphobia; emaphysema/asthma presented to ED with 3m of abdominal pain infrequent bowl movements and 30lbs weight loss x3m. Hospital day 1 and since CT abd shows multiple pulmonary lesions suspicious for malignancy. Multiple findings of bony metastatic disease including the Left iliac bone with a 6.3 cm soft tissue component that displaces the retroperitoneum and left kidney. Acute pathologic compression fracture of the  left L5 vertebral body, with mild endplate depression. Metastatic lytic lesion in L2 vertebral body, also the right L4 posterior elements. Indeterminate 1.8 cm hyperenhancing nodule in the pelvis, may be associated with the mesentery. Neuro surg - no intervention at this time.   Being reevaluated for symptom management and pain: pain controlled; anxiety due to mental health disorders is being exacerbated by hospitalization.     Patient want to get out of hospital even if it means Hospice; Lolis Steve, Palliative Care SW spoke to Zehra, HCP : both open to Hospice Consult    Morphine Equivalent Daily Dose (MEDD):  18mg    Recommendations:    Continue current medical management  Patient responds to non-pharmacolgical interventions: calm approach; allow time to express needs and address accordingly; avoid restraints; and leave lower two siderails of bed down as they contribute to her agoraphobia  Xanax 0.25mg PO twice day PRN for anxiety; otherwise as per psych recommendations    DC: morphine 2mg IVP q4H PRN for pain  add morphine 7.5mg PO  q3H PRN for pain 1-10    D/W Alanna Baca and Faustina and nursing    Remains Full Code for now - will address when patient is calmer  Please Call [phone] PRN

## 2020-12-07 NOTE — PROGRESS NOTE ADULT - SUBJECTIVE AND OBJECTIVE BOX
65yFemale with diagnosis: RESPIRATORY DISTRESS HYPOXIA LIVER MASS PATHALOGIC FRACTURE    interim events - refused MRI  downgraded to 3B    Patient seen, sitting bedside chair. + anxious and miserable; does not want to be in hospital at all wants to be out-home even if with hospice. Concerned about swelling in feet/cold feet; Pain not a focus    PHYSICAL EXAM  Chronically ill; cachectic  Breathing easy and unlabored  +edema bilateral LE    T(C): , Max: 36.6 (21:29)  T(F): 97.6  HR: 103 (82 - 110)  BP: 118/60 (83/57 - 118/60)  RR: 19 (18 - 19)  SpO2: 100% (80% - 100%)    LABS:                      9.5    6.56  )-----------( 370      ( 06 Dec 2020 08:38 )             32.0                                                                                      12-07    136  |  94<L>  |  14  ----------------------------<  103<H>  4.8   |  31  |  <0.5<L>    Ca    9.2      07 Dec 2020 11:31  Mg     1.8     12-06                                                  MEDICATIONS  (STANDING):  ALPRAZolam 0.25 milliGRAM(s) Oral every 6 hours  azithromycin  IVPB      azithromycin  IVPB 500 milliGRAM(s) IV Intermittent every 24 hours  budesonide 160 MICROgram(s)/formoterol 4.5 MICROgram(s) Inhaler 2 Puff(s) Inhalation two times a day  cefTRIAXone   IVPB 1000 milliGRAM(s) IV Intermittent every 24 hours  chlorhexidine 4% Liquid 1 Application(s) Topical <User Schedule>  dexAMETHasone  Injectable 4 milliGRAM(s) IV Push every 12 hours  enoxaparin Injectable 40 milliGRAM(s) SubCutaneous daily  influenza   Vaccine 0.5 milliLiter(s) IntraMuscular once  metoprolol tartrate 12.5 milliGRAM(s) Oral two times a day  sertraline 125 milliGRAM(s) Oral daily    MEDICATIONS  (PRN):  ALPRAZolam 0.25 milliGRAM(s) Oral every 12 hours PRN anxiety  morphine  IR 7.5 milliGRAM(s) Oral every 3 hours PRN Pain 1-10 as per palliative  [NOTE: just converted]

## 2020-12-07 NOTE — DISCHARGE NOTE NURSING/CASE MANAGEMENT/SOCIAL WORK - PATIENT PORTAL LINK FT
You can access the FollowMyHealth Patient Portal offered by [facility] by registering at the following website: [email or web address]. By joining [facility]’s FollowMyHealth portal, you will also be able to view your health information using other applications (apps) compatible with our system.

## 2020-12-07 NOTE — PROGRESS NOTE ADULT - ASSESSMENT
64 y/o female with PMH Asthma, emphysema (not on home O2), anxiety/depression presents to [facility] with worsening abdominal pain. Patient states her pain first began about 3 months ago. It initially presented itself as a sharp intermittent pain behind the navel, but over time grew to be constant with radiation to her hips/back. During this time she has also become very weak, developed dyspnea on exertion, and lost about 30lbs unintentionally. Patient denies any fevers, chills, nausea, vomiting, palpitations, or numbness/tingling. She does endorse constipation. All other ROS negative.     In the ED, vitals were /81, , RR 18, T 98.4, SPO2 93% on RA. CT Angio negative for PE, but showed multiple left upper lobe lesions suspicious for malignancy. Also evidence of liver and bone metastatic disease. Admitted to medicine for further management.     #abdominal pain- suspected metastatic cancer (unknown primary)  -incidental finding on CTA (Left upper lobe lung lesion and multiple bone/liver mets?)  -malignancy w/u---awaiting MRI Thoracic/lumbar spine (will attempt to get done today- yesterday she refused)  -neurosurgery following--remains on dexa 4 mg iv q12 hr  -pain control- morphine  -needs IR biopsy after MRI  - f/u MRI  -heme/onc recomm  -palliative care following  -awaiting transfer to regular medical floor once bed available  - c/w Rocephin and azithromycin for possible superimposed infection     #DVT/GI ppx  FULL CODE    guarded prognosis-    -awaiting transfer to regular medical floor out of vent unit     66 y/o female with PMH Asthma, emphysema (not on home O2), anxiety/depression presents to [facility] with worsening abdominal pain. Patient states her pain first began about 3 months ago. It initially presented itself as a sharp intermittent pain behind the navel, but over time grew to be constant with radiation to her hips/back. During this time she has also become very weak, developed dyspnea on exertion, and lost about 30lbs unintentionally. Patient denies any fevers, chills, nausea, vomiting, palpitations, or numbness/tingling. She does endorse constipation. All other ROS negative.     In the ED, vitals were /81, , RR 18, T 98.4, SPO2 93% on RA. CT Angio negative for PE, but showed multiple left upper lobe lesions suspicious for malignancy. Also evidence of liver and bone metastatic disease. Admitted to medicine for further management.     # Abd pain- suspected metastatic cancer (unknown primary)  - incidental finding on CTA showing Left upper lobe lung lesion and multiple bone/liver mets?  -CXR: Multiple rounded opacities throughout the left lung, findings highly suggestive of metastatic disease. Hyperinflation with flattening of the diaphragms c/w a COPD  - malignancy w/u---awaiting MRI Thoracic/lumbar spine, but pt has been refusing since 12/3  - neurosurgery following--C/w dexa 4 mg iv BID  - pain control- morphine  - c/w Rocephin and azithromycin for possible superimposed infection   - needs IR biopsy after MRI  - palliative care following  - F/u CXR 12/7 read  - F/u MRI  - F/u heme/onc recomm    # Hyperkalemia  - K 5.3 hemolyzed  - F/u BMP 11:00 and AM     # Asthma  # Emphysema not on home O2  - Symbicort     # HTN  - /62  - Metoprolol 12.5mg PO BID  - Normal ECHO, normal EF    # Agoraphobia  - Xanax 025mg PO Q6H, Setraline 125mg PO QD    # Malnutrition  - BMI <19  - Diet per nutrition    DVT ppx: Lovenox   GI ppx: none   Diet: regular  Activity: IAT   Dispo: acute, pending MRI and IR bone bx  FULL CODE     64 y/o female with PMH Asthma, emphysema (not on home O2), anxiety/depression presents to [facility] with worsening abdominal pain. Patient states her pain first began about 3 months ago. It initially presented itself as a sharp intermittent pain behind the navel, but over time grew to be constant with radiation to her hips/back. During this time she has also become very weak, developed dyspnea on exertion, and lost about 30lbs unintentionally. Patient denies any fevers, chills, nausea, vomiting, palpitations, or numbness/tingling. She does endorse constipation. All other ROS negative.     In the ED, vitals were /81, , RR 18, T 98.4, SPO2 93% on RA. CT Angio negative for PE, but showed multiple left upper lobe lesions suspicious for malignancy. Also evidence of liver and bone metastatic disease. Admitted to medicine for further management.     # Abd pain- suspected metastatic cancer (unknown primary)  - incidental finding on CTA showing Left upper lobe lung lesion and multiple bone/liver mets?  - CXR: Multiple rounded opacities throughout the left lung, findings highly suggestive of metastatic disease. Hyperinflation with flattening of the diaphragms c/w a COPD  - malignancy w/u---awaiting MRI Thoracic/lumbar spine, but pt has been refusing since 12/3 due to anxiety, might need MRI under anesthesia  - neurosurgery following--C/w dexa 4 mg iv BID  - pain control- morphine  - c/w Rocephin and azithromycin for possible superimposed infection   - needs IR biopsy after MRI  - palliative care following  - F/u CXR 12/7 read  - F/u MRI  - F/u heme/onc recomm    # Hyperkalemia  - K 5.3 hemolyzed  - F/u BMP 11:00 and AM     # Asthma  # Emphysema not on home O2  - Symbicort     # HTN  - /62  - Metoprolol 12.5mg PO BID  - Normal ECHO, normal EF    # Agoraphobia  - Xanax 025mg PO Q6H, Setraline 125mg PO QD    # Malnutrition  - BMI <19  - Diet per nutrition    DVT ppx: Lovenox   GI ppx: none   Diet: regular  Activity: IAT   Dispo: acute, pending MRI and IR bone bx  FULL CODE

## 2020-12-07 NOTE — PROGRESS NOTE ADULT - SUBJECTIVE AND OBJECTIVE BOX
Interventional Radiology Follow- Up Note      Procedure Requested: lumbar bone biopsy    HPI:  64 y/o female with PMH Asthma, emphysema (not on home O2), anxiety/depression presents to [facility] with worsening abdominal pain. Patient states her pain first began about 3 months ago. It initially presented itself as a sharp intermittent pain behind the navel, but over time grew to be constant with radiation to her hips/back. During this time she has also become very weak, developed dyspnea on exertion, and lost about 30lbs unintentionally. Patient denies any fevers, chills, nausea, vomiting, palpitations, or numbness/tingling. She does endorse constipation. All other ROS negative.     In the ED, vitals were /81, , RR 18, T 98.4, SPO2 93% on RA. CT Angio negative for PE, but showed multiple left upper lobe lesions suspicious for malignancy. Also evidence of liver and bone metastatic disease. Admitted to medicine for further management.  (04 Dec 2020 00:06)    Vitals: T(F): 97.6 (12-07-20 @ 12:51), Max: 97.9 (12-06-20 @ 21:29)  HR: 103 (12-07-20 @ 12:51) (82 - 110)  BP: 118/60 (12-07-20 @ 12:51) (83/57 - 118/60)  RR: 19 (12-07-20 @ 12:51) (18 - 19)  SpO2: --  Wt(kg): --    LABS:                        9.5    6.56  )-----------( 370      ( 06 Dec 2020 08:38 )             32.0     12-06    135  |  99  |  10  ----------------------------<  138<H>  5.3<H>   |  24  |  <0.5<L>    Ca    8.2<L>      06 Dec 2020 08:38  Mg     1.8     12-06      Impression: 65y Female admitted with RESPIRATORY DISTRESS HYPOXIA LIVER MASS PATHALOGIC FRACTURE    ASSESSMENT/ PLAN:   64 y/o female with PMH Asthma, emphysema (not on home O2), anxiety/depression presents with worsening abdominal pain. CT showed multiple left upper lobe lesions suspicious for malignancy with evidence of liver and bone metastatic disease.  On review of CT abdomen/pelvis, there is suspicious lumbar spine lesions with compression deformity of L5  - recommended MRI to confirm L5 lesion with plan for biopsy with ablation and kyphoplasty to follow  - patient refused MRI this morning   - patient is considering hospice care, does not want any intervention at this time  - please re-consult PRN     Please call Interventional Radiology [phone] with any questions, concerns, or issues regarding above.

## 2020-12-08 PROCEDURE — 99233 SBSQ HOSP IP/OBS HIGH 50: CPT

## 2020-12-08 PROCEDURE — 99497 ADVNCD CARE PLAN 30 MIN: CPT | Mod: 25

## 2020-12-08 RX ORDER — DRONABINOL 2.5 MG
2.5 CAPSULE ORAL DAILY
Refills: 0 | Status: DISCONTINUED | OUTPATIENT
Start: 2020-12-08 | End: 2020-12-10

## 2020-12-08 RX ORDER — SENNA PLUS 8.6 MG/1
2 TABLET ORAL AT BEDTIME
Refills: 0 | Status: DISCONTINUED | OUTPATIENT
Start: 2020-12-08 | End: 2020-12-10

## 2020-12-08 RX ORDER — BECLOMETHASONE DIPROPIONATE 40 UG/1
1 AEROSOL, METERED RESPIRATORY (INHALATION)
Refills: 0 | Status: DISCONTINUED | OUTPATIENT
Start: 2020-12-08 | End: 2020-12-08

## 2020-12-08 RX ADMIN — SERTRALINE 125 MILLIGRAM(S): 25 TABLET, FILM COATED ORAL at 11:24

## 2020-12-08 RX ADMIN — Medication 0.25 MILLIGRAM(S): at 12:22

## 2020-12-08 RX ADMIN — MORPHINE SULFATE 7.5 MILLIGRAM(S): 50 CAPSULE, EXTENDED RELEASE ORAL at 11:30

## 2020-12-08 RX ADMIN — Medication 2.5 MILLIGRAM(S): at 11:24

## 2020-12-08 RX ADMIN — Medication 4 MILLIGRAM(S): at 05:04

## 2020-12-08 RX ADMIN — Medication 12.5 MILLIGRAM(S): at 05:04

## 2020-12-08 RX ADMIN — MORPHINE SULFATE 7.5 MILLIGRAM(S): 50 CAPSULE, EXTENDED RELEASE ORAL at 21:20

## 2020-12-08 RX ADMIN — MORPHINE SULFATE 7.5 MILLIGRAM(S): 50 CAPSULE, EXTENDED RELEASE ORAL at 10:43

## 2020-12-08 RX ADMIN — Medication 0.25 MILLIGRAM(S): at 21:20

## 2020-12-08 RX ADMIN — Medication 4 MILLIGRAM(S): at 17:55

## 2020-12-08 RX ADMIN — BUDESONIDE AND FORMOTEROL FUMARATE DIHYDRATE 2 PUFF(S): 160; 4.5 AEROSOL RESPIRATORY (INHALATION) at 21:21

## 2020-12-08 RX ADMIN — MORPHINE SULFATE 7.5 MILLIGRAM(S): 50 CAPSULE, EXTENDED RELEASE ORAL at 00:56

## 2020-12-08 RX ADMIN — Medication 12.5 MILLIGRAM(S): at 17:59

## 2020-12-08 RX ADMIN — Medication 0.25 MILLIGRAM(S): at 12:24

## 2020-12-08 RX ADMIN — ENOXAPARIN SODIUM 40 MILLIGRAM(S): 100 INJECTION SUBCUTANEOUS at 11:24

## 2020-12-08 NOTE — PROGRESS NOTE ADULT - ASSESSMENT
66 y/o female with PMH Asthma, emphysema (not on home O2), anxiety/depression presents to [facility] with worsening abdominal pain. Patient states her pain first began about 3 months ago. It initially presented itself as a sharp intermittent pain behind the navel, but over time grew to be constant with radiation to her hips/back. During this time she has also become very weak, developed dyspnea on exertion, and lost about 30lbs unintentionally. Patient denies any fevers, chills, nausea, vomiting, palpitations, or numbness/tingling. She does endorse constipation. All other ROS negative.     In the ED, vitals were /81, , RR 18, T 98.4, SPO2 93% on RA. CT Angio negative for PE, but showed multiple left upper lobe lesions suspicious for malignancy. Also evidence of liver and bone metastatic disease. Admitted to medicine for further management.     # Abd pain- suspected metastatic cancer (unknown primary)  - incidental finding on CTA showing Left upper lobe lung lesion and multiple bone/liver mets?  - CXR: Multiple rounded opacities throughout the left lung, findings highly suggestive of metastatic disease. Hyperinflation with flattening of the diaphragms c/w a COPD  - CXR 12/7: slightly dec left lung opacities  - malignancy w/u:  MRI Thoracic/lumbar spine ordered, but pt has been refusing w/u since 12/3 due to anxiety, pursuing hospice at this time  - Hold off on IR bone biopsy for now  - neurosurgery following--C/w dexa 4 mg iv BID  - pain control- morphine  - c/w Rocephin and azithromycin for possible superimposed infection  - palliative care following, they will discuss DNR/I today  - F/u heme/onc recomm if pt agrees to w/u  - F/u hospice c/s     # Asthma  # Emphysema not on home O2  - Currently on 5L %, will titrate to goal of 88-92%  - Asmanex (home medication)    # HTN  - /62  - Metoprolol 12.5mg PO BID  - Normal ECHO, normal EF    # Agoraphobia  - Xanax 025mg PO Q6H, Setraline 125mg PO QD    # Malnutrition  - BMI <19  - Diet per nutrition    DVT ppx: Lovenox   GI ppx: none   Diet: regular  Activity: IAT   Dispo: acute, pending hospice c/s   FULL CODE   66 y/o female with PMH Asthma, emphysema (not on home O2), anxiety/depression presents to [facility] with worsening abdominal pain. Patient states her pain first began about 3 months ago. It initially presented itself as a sharp intermittent pain behind the navel, but over time grew to be constant with radiation to her hips/back. During this time she has also become very weak, developed dyspnea on exertion, and lost about 30lbs unintentionally. Patient denies any fevers, chills, nausea, vomiting, palpitations, or numbness/tingling. She does endorse constipation. All other ROS negative.     In the ED, vitals were /81, , RR 18, T 98.4, SPO2 93% on RA. CT Angio negative for PE, but showed multiple left upper lobe lesions suspicious for malignancy. Also evidence of liver and bone metastatic disease. Admitted to medicine for further management.     # Abd pain- suspected metastatic cancer (unknown primary)  - incidental finding on CTA showing Left upper lobe lung lesion and multiple bone/liver mets?  - CXR: Multiple rounded opacities throughout the left lung, findings highly suggestive of metastatic disease. Hyperinflation with flattening of the diaphragms c/w a COPD  - CXR 12/7: slightly dec left lung opacities  - malignancy w/u:  MRI Thoracic/lumbar spine ordered, but pt has been refusing w/u since 12/3 due to anxiety and unsure if would want to undergo chemotherapy given frail state, pursuing hospice care at this time  - Hold off on IR bone biopsy for now  - neurosurgery following--C/w dexa 4 mg iv BID  - pain control- morphine 7.5mg PO IR Q3H PRN  - palliative care following, they will discuss DNR/I today  - F/u heme/onc recomm if pt agrees to w/u  - F/u hospice c/s     # Asthma  # Emphysema not on home O2  - Currently on RA 98%, will titrate to goal of 88-92%  - Asmanex (home medication) not available Qvar here (intolerant to Symbicort)    # HTN  - /72  - Metoprolol 12.5mg PO BID  - Normal ECHO, normal EF    # Agoraphobia  - Xanax 025mg PO Q6H, Sertraline 125mg PO QD    # Malnutrition  - BMI <19  - Diet per nutrition  - Started Marinol 2.5mg PO QD     DVT ppx: Lovenox   GI ppx: none   Diet: regular  Activity: IAT   Dispo: acute, pending hospice c/s   FULL CODE   64 y/o female with PMH Asthma, emphysema (not on home O2), anxiety/depression presents to [facility] with worsening abdominal pain. Patient states her pain first began about 3 months ago. It initially presented itself as a sharp intermittent pain behind the navel, but over time grew to be constant with radiation to her hips/back. During this time she has also become very weak, developed dyspnea on exertion, and lost about 30lbs unintentionally. Patient denies any fevers, chills, nausea, vomiting, palpitations, or numbness/tingling. She does endorse constipation. All other ROS negative.     In the ED, vitals were /81, , RR 18, T 98.4, SPO2 93% on RA. CT Angio negative for PE, but showed multiple left upper lobe lesions suspicious for malignancy. Also evidence of liver and bone metastatic disease. Admitted to medicine for further management.     # Abd pain- suspected metastatic cancer (unknown primary)  - incidental finding on CTA showing Left upper lobe lung lesion and multiple bone/liver mets?  - CXR: Multiple rounded opacities throughout the left lung, findings highly suggestive of metastatic disease. Hyperinflation with flattening of the diaphragms c/w a COPD  - CXR 12/7: slightly dec left lung opacities  - malignancy w/u:  MRI Thoracic/lumbar spine ordered, but pt has been refusing w/u since 12/3 due to anxiety and unsure if would want to undergo chemotherapy given frail state, pursuing hospice care at this time  - Hold off on IR bone biopsy for now  - neurosurgery following--C/w dexa 4 mg iv BID  - pain control- morphine 7.5mg PO IR Q3H PRN  - palliative care following, they will discuss DNR/I today  - F/u heme/onc recomm if pt agrees to w/u  - F/u hospice c/s     # Asthma  # Emphysema not on home O2  - Currently on RA 98%, will titrate to goal of 88-92%  - Asmanex (home medication) not available Qvar here (intolerant to Symbicort)    # HTN  - /72  - Metoprolol 12.5mg PO BID  - Normal ECHO, normal EF    # Agoraphobia  - Xanax 025mg PO Q6H, Sertraline 125mg PO QD    # Malnutrition, SEVERE PCM   - BMI <19  - Diet per nutrition  - Started Marinol 2.5mg PO QD     DVT ppx: Lovenox   GI ppx: none   Diet: regular  Activity: IAT   Dispo: acute, pending hospice c/s   FULL CODE

## 2020-12-08 NOTE — PROGRESS NOTE ADULT - ASSESSMENT
Consult Summary  65 year old woman with history of COPD and anxiety/depression/agoraphobia presents with findings consistent with metastatic cancer. Palliative care consulted for pain management and GoC.    Patient seen outside in le. Hospice consult placed yesterday and hospice discussed with the patient and the patient's friend, Zehra. Discussed treatment options and hospice again with the patient today. Patient and friend Zehra in agreement with hospice, and plan is for patient to go home with hospice on Thursday 12/10 with Zehra. We discussed that if patient changed mind and did wish to pursue workup/treatment of her likely malignancy, that hospice could be rescinded and she could pursue workup/treatment. She expressed understanding. We also discussed code status, and patient wished to be DNR/DNI.     Morphine Equivalent Daily Dose (MEDD): 22.5mg    Recommendations:  -DNR/DNI  -New MOLST filled out and placed in chart  -Plan for discharge to home hospice on 12/10  -continue morphine 7.5mg q3h PRN for mild/moderate/severe pain (hold for sedation, confusion, RR<10)  -continue treatment for anxiety/depression per psychiatry team  -please start senna 2 tabs qhs and miralax 17g q24h PRN if patient does not have diarrhea for opioid induced constipation  -palliative care will continue to follow      Please Call [phone] PRN    20 minutes spent on advance care planning

## 2020-12-08 NOTE — CHART NOTE - NSCHARTNOTEFT_GEN_A_CORE
Registered Dietitian Follow-Up     Patient Profile Reviewed                           Yes [x]   No []     Nutrition History Previously Obtained        Yes [x]  No []       Pertinent Subjective Information: Pt OOB in chair in hallway at time of attempted assessment. Pt seems confused & preoccupied during RD assessment. Consistently asking RD to put on her robe because she's cold & then take it off "because shes getting panicky". Untouched lunch tray at bedside, pt does not want to eat but willing to drink some apple juice. Per staff pt either refuses meal or eats 25% or less. Marinol 1x/day added today. Previous RD supplements recs remain pending. Pt possibly hospice care vs. home hospice, so will continue with non-aggressive interventions at this time & monitor POC/GOC. Recs d/w LIP (Dr. Dyer).      Pertinent Medical Interventions:  1. Abd pain 2/2 suspected metastatic cancer (unknown primary)--MRI thoracic/lumbar spine ordered but pt refusing since 12/3; bone biopsy on hold; hospice c/s   2. Emphysema not on home O2--currently on RA   3. Agoraphobia    Diet order: Regular      Anthropometrics:  - Ht. 162.56cm   - Wt. 40.8kg (12/3)   - BMI 15.4   - IBW 54.5kg      Pertinent Lab Data: (12/7/2020) Cl 94, Cr <0.5, glucose 103      Pertinent Meds: lovenox, marinol, symbicort, decadron, metoprolol     Physical Findings:  - Appearance: alert but confused at times   - GI function: LBM 12/6   - Tubes: n/a   - Oral/Mouth cavity:  - Skin: 1+ edema to b/l ankles.      Nutrition Requirements  Weight Used: 40.8kg CBW (continued from RD initial assessment)      Estimated Energy Needs    Continue [x]  7801-3325 kcal/day (25-30 kcal/kg CBW d/t PCM)   Estimated Protein Needs    Continue [x]   65-73 g/day (1.6-1.8 g/kg CBW d/t PCM)  Estimated Fluid Needs        Continue [x]  1mL/kcal      Nutrient Intake: meal refusal or <50% intake.      [x] Previous Nutrition Diagnosis: (1) Severe PCM; (2) inadequate oral intake             [x] Ongoing          [] Resolved     Nutrition Intervention: meals & snacks, medical food supplements, coordination of care  Recommend:  1. Activate pending diet order for Ensure Enlive BID & Prosource Gelatin BID.  2. Continue 'Regular diet. Offer meal asst 1:1 feeds to encourage intake.   3. consider increasing marinol to BID if not contraindicated.      Goal/Expected Outcome: Pt to consume/tolerate at least 50% meals & supplements upon f/u in 3 days.     Indicator/Monitoring: diet order, energy intake, nutrition related labs, body composition, NFPF (GOC/POC, GI s/s, PO intake)

## 2020-12-08 NOTE — GOALS OF CARE CONVERSATION - ADVANCED CARE PLANNING - CONVERSATION DETAILS
Palliative care team met with the patient on unit to review goals of care. Patient's current plan is to be discharged to her friend Zehra Arroyo home with hospice care services.  Patient is aware and in agreement with plan. Patient continues to endorse friend Zehra Arroyo is her caregiver. Palliative care team made patient and friend Zehra Arroyo aware should patient change her mind and wish to pursue any type of disease directed treatment she may due so at any time. She has the option to withdraw from the hospice program should she wish to do so. Both the patient and her caregiver Zehra Arroyo verbalized understanding of their options.   Reviewed code status with patient; at this time she is opting for DNR/DNI.     Current plan is to continue ongoing med management. DNR/DNI. Discharge home with hospice.

## 2020-12-08 NOTE — PROGRESS NOTE ADULT - SUBJECTIVE AND OBJECTIVE BOX
Brief HPI  65 year old woman with history of COPD and anxiety/depression/agoraphobia presents with findings consistent with metastatic cancer. Palliative care consulted for pain management and GoC.    Interval History  -Patient seen outside in le  -Hospice consult placed yesterday and hospice discussed with the patient and the patient's friend, Zehra  -Discussed treatment options and hospice again with the patient today   -Patient and friend Zehra in agreement with hospice, and plan is for patient to go home with hospice on Thursday 12/10 with Zehra  -We discussed that if patient changed mind and did wish to pursue workup/treatment of her likely malignancy, that hospice could be rescinded and she could pursue workup/treatment  -She expressed understanding  -We also discussed code status, and patient wished to be DNR/DNI      PHYSICAL EXAM    T(C): , Max: 36.1 (04:46)  T(F): 96.1  HR: 82 (80 - 91)  BP: 120/71 (92/61 - 141/75)  RR: 19 (18 - 19)  SpO2: --    GEN:  NAD, cachectic, alert and cooperative  HEENT: EOMI, no scleral icterus, MMM  Neck: no thyromegaly, no JVD noted  CV: no edema, no tachycardia  Lungs: no wheezing, no accessory muscle use  Neuro: no focal deficits, AAOx3-4  Psych: no depressed affect, not anxious today  Skin: no rashes, warm/dry      LABS:                                                                                       12-07    136  |  94<L>  |  14  ----------------------------<  103<H>  4.8   |  31  |  <0.5<L>    Ca    9.2      07 Dec 2020 11:31                                                 MEDICATIONS  (STANDING):  ALPRAZolam 0.25 milliGRAM(s) Oral every 6 hours  budesonide 160 MICROgram(s)/formoterol 4.5 MICROgram(s) Inhaler 2 Puff(s) Inhalation two times a day  chlorhexidine 4% Liquid 1 Application(s) Topical <User Schedule>  dexAMETHasone  Injectable 4 milliGRAM(s) IV Push every 12 hours  dronabinol 2.5 milliGRAM(s) Oral daily  enoxaparin Injectable 40 milliGRAM(s) SubCutaneous daily  influenza   Vaccine 0.5 milliLiter(s) IntraMuscular once  metoprolol tartrate 12.5 milliGRAM(s) Oral two times a day  sertraline 125 milliGRAM(s) Oral daily    MEDICATIONS  (PRN):  ALPRAZolam 0.25 milliGRAM(s) Oral every 12 hours PRN anxiety  morphine  IR 7.5 milliGRAM(s) Oral every 3 hours PRN Pain 1-10 as per palliative

## 2020-12-08 NOTE — PROGRESS NOTE ADULT - SUBJECTIVE AND OBJECTIVE BOX
Hospital Day:  5d    Subjective:    Patient is a 65y old  Female who presents with a chief complaint of abdominal pain (07 Dec 2020 13:27)      Admitted to medicine for a primary diagnosis of     Past Medical Hx:   Depression    Agoraphobia    Anxiety    Emphysema lung      Past Sx:  No significant past surgical history      Allergies:  penicillins (Unknown)    Current Meds:   Standng Meds:  ALPRAZolam 0.25 milliGRAM(s) Oral every 6 hours  azithromycin  IVPB      azithromycin  IVPB 500 milliGRAM(s) IV Intermittent every 24 hours  budesonide 160 MICROgram(s)/formoterol 4.5 MICROgram(s) Inhaler 2 Puff(s) Inhalation two times a day  cefTRIAXone   IVPB 1000 milliGRAM(s) IV Intermittent every 24 hours  chlorhexidine 4% Liquid 1 Application(s) Topical <User Schedule>  dexAMETHasone  Injectable 4 milliGRAM(s) IV Push every 12 hours  enoxaparin Injectable 40 milliGRAM(s) SubCutaneous daily  influenza   Vaccine 0.5 milliLiter(s) IntraMuscular once  metoprolol tartrate 12.5 milliGRAM(s) Oral two times a day  sertraline 125 milliGRAM(s) Oral daily    PRN Meds:  ALPRAZolam 0.25 milliGRAM(s) Oral every 12 hours PRN anxiety  morphine  IR 7.5 milliGRAM(s) Oral every 3 hours PRN Pain 1-10 as per palliative    HOME MEDICATIONS:  Advair Diskus 250 mcg-50 mcg inhalation powder: 1 puff(s) inhaled 2 times a day  ALPRAZolam 0.5 mg oral tablet: 0.5 tab(s) orally 3 times a day  busPIRone 10 mg oral tablet: 1 tab(s) orally 2 times a day  sertraline 100 mg oral tablet: 1 tab(s) orally once a day      Vital Signs:   T(F): 97 (12-08-20 @ 04:46), Max: 97.6 (12-07-20 @ 12:51)  HR: 80 (12-08-20 @ 04:46) (80 - 103)  BP: 141/75 (12-08-20 @ 04:46) (92/61 - 141/75)  RR: 18 (12-08-20 @ 04:46) (18 - 19)  SpO2: 100% (12-07-20 @ 13:09) (80% - 100%)        Physical Exam:   GENERAL: NAD  HEENT: NCAT  CHEST/LUNG: CTAB  HEART: Regular rate and rhythm; s1 s2 appreciated, No murmurs, rubs, or gallops  ABDOMEN: Soft, Nontender, Nondistended; Bowel sounds present  EXTREMITIES: No LE edema b/l  SKIN: no rashes, no new lesions  NERVOUS SYSTEM:  Alert & Oriented X3  LINES/CATHETERS:        Labs:                         9.5    6.56  )-----------( 370      ( 06 Dec 2020 08:38 )             32.0       07 Dec 2020 11:31    136    |  94     |  14     ----------------------------<  103    4.8     |  31     |  <0.5     Ca    9.2        07 Dec 2020 11:31  Mg     1.8       06 Dec 2020 08:38          Amylase --, Lipase 23, 12-03-20 @ 18:56      Serum Pro-Brain Natriuretic Peptide: 223 pg/mL (12-03-20 @ 18:56)                Culture - Blood (collected 12-04-20 @ 00:15)  Source: .Blood Blood-Peripheral  Preliminary Report (12-05-20 @ 08:00):    No growth to date.         Hospital Day:  5d    Subjective:    Patient is a 65y old  Female who presents with a chief complaint of abdominal pain. No acute events overnight. This morning, pt was found in chair outside room, breathing RA (reportedly 98%), denies any physical complaints. Asking to speak with HCP.       Admitted to medicine for a primary diagnosis of Abd pain     Past Medical Hx:   Depression    Agoraphobia    Anxiety    Emphysema lung      Past Sx:  No significant past surgical history      Allergies:  penicillins (Unknown)    Current Meds:   Standng Meds:  ALPRAZolam 0.25 milliGRAM(s) Oral every 6 hours  azithromycin  IVPB      azithromycin  IVPB 500 milliGRAM(s) IV Intermittent every 24 hours  budesonide 160 MICROgram(s)/formoterol 4.5 MICROgram(s) Inhaler 2 Puff(s) Inhalation two times a day  cefTRIAXone   IVPB 1000 milliGRAM(s) IV Intermittent every 24 hours  chlorhexidine 4% Liquid 1 Application(s) Topical <User Schedule>  dexAMETHasone  Injectable 4 milliGRAM(s) IV Push every 12 hours  enoxaparin Injectable 40 milliGRAM(s) SubCutaneous daily  influenza   Vaccine 0.5 milliLiter(s) IntraMuscular once  metoprolol tartrate 12.5 milliGRAM(s) Oral two times a day  sertraline 125 milliGRAM(s) Oral daily    PRN Meds:  ALPRAZolam 0.25 milliGRAM(s) Oral every 12 hours PRN anxiety  morphine  IR 7.5 milliGRAM(s) Oral every 3 hours PRN Pain 1-10 as per palliative    HOME MEDICATIONS:  Advair Diskus 250 mcg-50 mcg inhalation powder: 1 puff(s) inhaled 2 times a day  ALPRAZolam 0.5 mg oral tablet: 0.5 tab(s) orally 3 times a day  busPIRone 10 mg oral tablet: 1 tab(s) orally 2 times a day  sertraline 100 mg oral tablet: 1 tab(s) orally once a day      Vital Signs:   T(F): 97 (12-08-20 @ 04:46), Max: 97.6 (12-07-20 @ 12:51)  HR: 80 (12-08-20 @ 04:46) (80 - 103)  BP: 141/75 (12-08-20 @ 04:46) (92/61 - 141/75)  RR: 18 (12-08-20 @ 04:46) (18 - 19)  SpO2: 100% (12-07-20 @ 13:09) (80% - 100%)        Physical Exam:   GENERAL: cachectic, anxious, irritable, answering questions appropriately   HEENT: NCAT  CHEST/LUNG: diminished breath sounds   HEART: Regular rate and rhythm; s1 s2 appreciated, No murmurs, rubs, or gallops  ABDOMEN: Soft, Nontender, Nondistended; Bowel sounds present  EXTREMITIES: No LE edema b/l  SKIN: no rashes, no new lesions  NERVOUS SYSTEM:  Alert & Oriented X3  LINES/CATHETERS: IV        Labs:                         9.5    6.56  )-----------( 370      ( 06 Dec 2020 08:38 )             32.0       07 Dec 2020 11:31    136    |  94     |  14     ----------------------------<  103    4.8     |  31     |  <0.5     Ca    9.2        07 Dec 2020 11:31  Mg     1.8       06 Dec 2020 08:38          Amylase --, Lipase 23, 12-03-20 @ 18:56      Serum Pro-Brain Natriuretic Peptide: 223 pg/mL (12-03-20 @ 18:56)                Culture - Blood (collected 12-04-20 @ 00:15)  Source: .Blood Blood-Peripheral  Preliminary Report (12-05-20 @ 08:00):    No growth to date.

## 2020-12-08 NOTE — HOSPICE CARE NOTE - CONVESATION DETAILS
Hospice c/s completed.  P/C to friend Zehra who is in agreement with hospice service and will care for patient at her home on [address].  Equipment to be delivered 12/9/2020 and hospice admission scheduled for Thursday 12/10/2020.  Confirmed same with DARRYN Gilliland.

## 2020-12-09 LAB
ANION GAP SERPL CALC-SCNC: 16 MMOL/L — HIGH (ref 7–14)
BASOPHILS # BLD AUTO: 0.02 K/UL — SIGNIFICANT CHANGE UP (ref 0–0.2)
BASOPHILS NFR BLD AUTO: 0.2 % — SIGNIFICANT CHANGE UP (ref 0–1)
BUN SERPL-MCNC: 11 MG/DL — SIGNIFICANT CHANGE UP (ref 10–20)
CALCIUM SERPL-MCNC: 9.3 MG/DL — SIGNIFICANT CHANGE UP (ref 8.5–10.1)
CHLORIDE SERPL-SCNC: 90 MMOL/L — LOW (ref 98–110)
CO2 SERPL-SCNC: 25 MMOL/L — SIGNIFICANT CHANGE UP (ref 17–32)
CREAT SERPL-MCNC: <0.5 MG/DL — LOW (ref 0.7–1.5)
CULTURE RESULTS: SIGNIFICANT CHANGE UP
EOSINOPHIL # BLD AUTO: 0 K/UL — SIGNIFICANT CHANGE UP (ref 0–0.7)
EOSINOPHIL NFR BLD AUTO: 0 % — SIGNIFICANT CHANGE UP (ref 0–8)
GLUCOSE SERPL-MCNC: 95 MG/DL — SIGNIFICANT CHANGE UP (ref 70–99)
HCT VFR BLD CALC: 32.4 % — LOW (ref 37–47)
HGB BLD-MCNC: 9.9 G/DL — LOW (ref 12–16)
IMM GRANULOCYTES NFR BLD AUTO: 0.4 % — HIGH (ref 0.1–0.3)
LYMPHOCYTES # BLD AUTO: 1.72 K/UL — SIGNIFICANT CHANGE UP (ref 1.2–3.4)
LYMPHOCYTES # BLD AUTO: 17.5 % — LOW (ref 20.5–51.1)
MAGNESIUM SERPL-MCNC: 1.9 MG/DL — SIGNIFICANT CHANGE UP (ref 1.8–2.4)
MCHC RBC-ENTMCNC: 30.6 G/DL — LOW (ref 32–37)
MCHC RBC-ENTMCNC: 31.4 PG — HIGH (ref 27–31)
MCV RBC AUTO: 102.9 FL — HIGH (ref 81–99)
MONOCYTES # BLD AUTO: 1.24 K/UL — HIGH (ref 0.1–0.6)
MONOCYTES NFR BLD AUTO: 12.6 % — HIGH (ref 1.7–9.3)
NEUTROPHILS # BLD AUTO: 6.8 K/UL — HIGH (ref 1.4–6.5)
NEUTROPHILS NFR BLD AUTO: 69.3 % — SIGNIFICANT CHANGE UP (ref 42.2–75.2)
NRBC # BLD: 0 /100 WBCS — SIGNIFICANT CHANGE UP (ref 0–0)
PLATELET # BLD AUTO: 407 K/UL — HIGH (ref 130–400)
POTASSIUM SERPL-MCNC: 4.8 MMOL/L — SIGNIFICANT CHANGE UP (ref 3.5–5)
POTASSIUM SERPL-SCNC: 4.8 MMOL/L — SIGNIFICANT CHANGE UP (ref 3.5–5)
RBC # BLD: 3.15 M/UL — LOW (ref 4.2–5.4)
RBC # FLD: 14 % — SIGNIFICANT CHANGE UP (ref 11.5–14.5)
SODIUM SERPL-SCNC: 131 MMOL/L — LOW (ref 135–146)
SPECIMEN SOURCE: SIGNIFICANT CHANGE UP
WBC # BLD: 9.82 K/UL — SIGNIFICANT CHANGE UP (ref 4.8–10.8)
WBC # FLD AUTO: 9.82 K/UL — SIGNIFICANT CHANGE UP (ref 4.8–10.8)

## 2020-12-09 PROCEDURE — 99232 SBSQ HOSP IP/OBS MODERATE 35: CPT

## 2020-12-09 PROCEDURE — 99231 SBSQ HOSP IP/OBS SF/LOW 25: CPT

## 2020-12-09 RX ORDER — SENNA PLUS 8.6 MG/1
2 TABLET ORAL
Qty: 0 | Refills: 0 | DISCHARGE
Start: 2020-12-09

## 2020-12-09 RX ORDER — SERTRALINE 25 MG/1
5 TABLET, FILM COATED ORAL
Qty: 0 | Refills: 0 | DISCHARGE
Start: 2020-12-09

## 2020-12-09 RX ORDER — MORPHINE SULFATE 50 MG/1
7.5 CAPSULE, EXTENDED RELEASE ORAL
Qty: 0 | Refills: 0 | DISCHARGE
Start: 2020-12-09

## 2020-12-09 RX ORDER — ALPRAZOLAM 0.25 MG
1 TABLET ORAL
Qty: 0 | Refills: 0 | DISCHARGE
Start: 2020-12-09

## 2020-12-09 RX ORDER — MORPHINE SULFATE 50 MG/1
0.5 CAPSULE, EXTENDED RELEASE ORAL
Qty: 0 | Refills: 0 | DISCHARGE

## 2020-12-09 RX ORDER — DRONABINOL 2.5 MG
1 CAPSULE ORAL
Qty: 0 | Refills: 0 | DISCHARGE
Start: 2020-12-09

## 2020-12-09 RX ORDER — SERTRALINE 25 MG/1
1 TABLET, FILM COATED ORAL
Qty: 0 | Refills: 0 | DISCHARGE

## 2020-12-09 RX ORDER — ALPRAZOLAM 0.25 MG
0.5 TABLET ORAL
Qty: 0 | Refills: 0 | DISCHARGE

## 2020-12-09 RX ORDER — METOPROLOL TARTRATE 50 MG
0.5 TABLET ORAL
Qty: 0 | Refills: 0 | DISCHARGE

## 2020-12-09 RX ADMIN — Medication 4 MILLIGRAM(S): at 05:37

## 2020-12-09 RX ADMIN — BUDESONIDE AND FORMOTEROL FUMARATE DIHYDRATE 2 PUFF(S): 160; 4.5 AEROSOL RESPIRATORY (INHALATION) at 21:35

## 2020-12-09 RX ADMIN — Medication 0.25 MILLIGRAM(S): at 11:40

## 2020-12-09 RX ADMIN — BUDESONIDE AND FORMOTEROL FUMARATE DIHYDRATE 2 PUFF(S): 160; 4.5 AEROSOL RESPIRATORY (INHALATION) at 11:40

## 2020-12-09 RX ADMIN — Medication 2.5 MILLIGRAM(S): at 11:39

## 2020-12-09 RX ADMIN — ENOXAPARIN SODIUM 40 MILLIGRAM(S): 100 INJECTION SUBCUTANEOUS at 11:39

## 2020-12-09 RX ADMIN — SENNA PLUS 2 TABLET(S): 8.6 TABLET ORAL at 21:34

## 2020-12-09 RX ADMIN — Medication 12.5 MILLIGRAM(S): at 17:09

## 2020-12-09 RX ADMIN — SERTRALINE 125 MILLIGRAM(S): 25 TABLET, FILM COATED ORAL at 11:39

## 2020-12-09 RX ADMIN — Medication 12.5 MILLIGRAM(S): at 05:38

## 2020-12-09 RX ADMIN — Medication 0.25 MILLIGRAM(S): at 21:34

## 2020-12-09 RX ADMIN — CHLORHEXIDINE GLUCONATE 1 APPLICATION(S): 213 SOLUTION TOPICAL at 05:38

## 2020-12-09 RX ADMIN — MORPHINE SULFATE 7.5 MILLIGRAM(S): 50 CAPSULE, EXTENDED RELEASE ORAL at 21:34

## 2020-12-09 NOTE — PROGRESS NOTE ADULT - SUBJECTIVE AND OBJECTIVE BOX
Brief HPI  65 year old woman with history of COPD and anxiety/depression/agoraphobia presents with findings consistent with metastatic cancer. Palliative care consulted for pain management and GoC.    Interval History  -Patient seen in room  -She was asleep at time of visit  -She showed no nonverbal signs of pain or distress      PHYSICAL EXAM    GEN:  NAD, cachectic, asleep  HEENT: EOMI, no scleral icterus, MMM  Neck:  no JVD noted  CV: no edema, no tachycardia  Lungs: no wheezing, no accessory muscle use  Neuro: asleep  Psych: unable to determine as patient asleep  Skin: no rashes, warm/dry      Vital Signs Last 24 Hrs  T(C): 36.7 (09 Dec 2020 14:02), Max: 36.7 (09 Dec 2020 14:02)  T(F): 98 (09 Dec 2020 14:02), Max: 98 (09 Dec 2020 14:02)  HR: 91 (09 Dec 2020 14:02) (80 - 94)  BP: 99/55 (09 Dec 2020 14:02) (99/55 - 121/74)  BP(mean): --  RR: 18 (09 Dec 2020 05:07) (18 - 18)  SpO2: 98% (09 Dec 2020 00:00) (88% - 98%)    Labs    12-09    131<L>  |  90<L>  |  11  ----------------------------<  95  4.8   |  25  |  <0.5<L>    Ca    9.3      09 Dec 2020 06:10  Mg     1.9     12-09                              9.9    9.82  )-----------( 407      ( 09 Dec 2020 06:10 )             32.4       Medications    MEDICATIONS  (STANDING):  ALPRAZolam 0.25 milliGRAM(s) Oral every 6 hours  budesonide 160 MICROgram(s)/formoterol 4.5 MICROgram(s) Inhaler 2 Puff(s) Inhalation two times a day  chlorhexidine 4% Liquid 1 Application(s) Topical <User Schedule>  dronabinol 2.5 milliGRAM(s) Oral daily  enoxaparin Injectable 40 milliGRAM(s) SubCutaneous daily  influenza   Vaccine 0.5 milliLiter(s) IntraMuscular once  metoprolol tartrate 12.5 milliGRAM(s) Oral two times a day  predniSONE   Tablet   Oral   predniSONE   Tablet 40 milliGRAM(s) Oral daily  senna 2 Tablet(s) Oral at bedtime  sertraline 125 milliGRAM(s) Oral daily    MEDICATIONS  (PRN):  ALPRAZolam 0.25 milliGRAM(s) Oral every 12 hours PRN anxiety  morphine  IR 7.5 milliGRAM(s) Oral every 3 hours PRN Pain 1-10 as per palliative

## 2020-12-09 NOTE — DISCHARGE NOTE PROVIDER - NSDCMRMEDTOKEN_GEN_ALL_CORE_FT
Advair Diskus 250 mcg-50 mcg inhalation powder: 1 puff(s) inhaled 2 times a day  ALPRAZolam 0.25 mg oral tablet: 1 tab(s) orally every 6 hours  ALPRAZolam 0.25 mg oral tablet: 1 tab(s) orally every 12 hours, As needed, anxiety  dronabinol 2.5 mg oral capsule: 1 cap(s) orally once a day  Metoprolol Tartrate 25 mg oral tablet: 0.5 tab(s) orally 2 times a day  Morphine IR 15 mg oral tablet: 0.5 tab(s) orally every 3 hours, As Needed  predniSONE 20 mg oral tablet: 2 tab(s) orally once a day last dose 12/11/2020  senna oral tablet: 2 tab(s) orally once a day (at bedtime)  sertraline 25 mg oral tablet: 5 tab(s) orally once a day

## 2020-12-09 NOTE — DISCHARGE NOTE PROVIDER - HOSPITAL COURSE
66 y/o female with PMH Asthma, emphysema (not on home O2), anxiety/depression presents to [facility] with worsening abdominal pain. Patient states her pain first began about 3 months ago. It initially presented itself as a sharp intermittent pain behind the navel, but over time grew to be constant with radiation to her hips/back. During this time she has also become very weak, developed dyspnea on exertion, and lost about 30lbs unintentionally. Patient denies any fevers, chills, nausea, vomiting, palpitations, or numbness/tingling. She does endorse constipation. All other ROS negative.     In the ED, vitals were /81, , RR 18, T 98.4, SPO2 93% on RA. CT Angio negative for PE, but showed multiple left upper lobe lesions suspicious for malignancy. Also evidence of liver and bone metastatic disease. Admitted to medicine for further management.     Monitored in Step down unit for respiratory concerns. Ultimately found to have multiple upper lobe lesions and metastatic malignancy on imaging. The patient does have a smoking history. A primary source was never able to be identified as the patient has agoraphobia and refused any MRI and refused any biopsy. She opted for discharge on hospice and was subsequently discharged home with hospice intake.

## 2020-12-09 NOTE — DISCHARGE NOTE PROVIDER - NSDCCPGOAL_GEN_ALL_CORE_FT
Called omer Arriola at [facility].  A-med transportation is on its way to  pt to transport back to NH.   To get better and follow your care plan as instructed.

## 2020-12-09 NOTE — PROGRESS NOTE ADULT - ASSESSMENT
Consult Summary  65 year old woman with history of COPD and anxiety/depression/agoraphobia presents with findings consistent with metastatic cancer. Palliative care consulted for pain management and GoC.    Patient seen in room. She was asleep at time of visit. She showed no nonverbal signs of pain or distress.    Morphine Equivalent Daily Dose (MEDD): 15mg    Recommendations:  -DNR/DNI  -Plan for discharge to home hospice on 12/10  -change PO morphine to 7.5mg q4h PRN for mild/moderate/severe pain (hold for sedation, confusion, RR<10)  -continue treatment for anxiety/depression per psychiatry team  -continue senna 2 tabs qhs  -recommend miralax 17g q24h PRN for constipation  -palliative care will continue to follow      Please Call [phone] PRN

## 2020-12-09 NOTE — DISCHARGE NOTE PROVIDER - NSFOLLOWUPCLINICS_GEN_ALL_ED_FT
[facility]  Medicine  [address], [place]   Phone: [phone]  Fax:   Follow Up Time:

## 2020-12-09 NOTE — DISCHARGE NOTE PROVIDER - NSDCCPCAREPLAN_GEN_ALL_CORE_FT
PRINCIPAL DISCHARGE DIAGNOSIS  Diagnosis: Respiratory distress  Assessment and Plan of Treatment: You were found initially to have some respiratory distress that has subsequently resolved.      SECONDARY DISCHARGE DIAGNOSES  Diagnosis: Liver mass  Assessment and Plan of Treatment: You were found to have liver mass with multiple lung nodules. You were offered imaging and biopsy for staging and ultimate diagnosis but refused to pursue any intervention. You opted for hospice at home and will be following up with hospice for intake upon return to your home.

## 2020-12-09 NOTE — PROGRESS NOTE ADULT - SUBJECTIVE AND OBJECTIVE BOX
HPI:  64 y/o female with PMH Asthma, emphysema (not on home O2), anxiety/depression presents to [facility] with worsening abdominal pain. Patient states her pain first began about 3 months ago. It initially presented itself as a sharp intermittent pain behind the navel, but over time grew to be constant with radiation to her hips/back. During this time she has also become very weak, developed dyspnea on exertion, and lost about 30lbs unintentionally. Patient denies any fevers, chills, nausea, vomiting, palpitations, or numbness/tingling. She does endorse constipation. All other ROS negative.     In the ED, vitals were /81, , RR 18, T 98.4, SPO2 93% on RA. CT Angio negative for PE, but showed multiple left upper lobe lesions suspicious for malignancy. Also evidence of liver and bone metastatic disease. Admitted to medicine for further management.  (04 Dec 2020 00:06)    Currently admitted to medicine with the primary diagnosis of Respiratory distress       Today is hospital day 6d.     INTERVAL HPI / OVERNIGHT EVENTS:  Patient was examined and seen at bedside. This morning she is resting comfortably in bed and reports continued left abdominal pain, constant for the last 3 months.     ROS: Otherwise unremarkable     PAST MEDICAL & SURGICAL HISTORY  Depression    Agoraphobia    Anxiety    Emphysema lung    No significant past surgical history      ALLERGIES  penicillins (Unknown)    MEDICATIONS  STANDING MEDICATIONS  ALPRAZolam 0.25 milliGRAM(s) Oral every 6 hours  budesonide 160 MICROgram(s)/formoterol 4.5 MICROgram(s) Inhaler 2 Puff(s) Inhalation two times a day  chlorhexidine 4% Liquid 1 Application(s) Topical <User Schedule>  dronabinol 2.5 milliGRAM(s) Oral daily  enoxaparin Injectable 40 milliGRAM(s) SubCutaneous daily  influenza   Vaccine 0.5 milliLiter(s) IntraMuscular once  metoprolol tartrate 12.5 milliGRAM(s) Oral two times a day  predniSONE   Tablet   Oral   predniSONE   Tablet 40 milliGRAM(s) Oral daily  senna 2 Tablet(s) Oral at bedtime  sertraline 125 milliGRAM(s) Oral daily    PRN MEDICATIONS  ALPRAZolam 0.25 milliGRAM(s) Oral every 12 hours PRN  morphine  IR 7.5 milliGRAM(s) Oral every 3 hours PRN    VITALS:  T(F): 97.3  HR: 94  BP: 111/75  RR: 18  SpO2: 98%    PHYSICAL EXAM  GEN: Sitting in chair, very cachectic   PULM: Clear to auscultation bilaterally, No wheezes  CVS: Regular rate and rhythm, S1-S2, no murmurs  ABD: Soft, non-tender, non-distended, no guarding  EXT: No edema, warm  NEURO: A&Ox3, no focal deficits  PSYCH: anxious affect, difficult to maintain eye contact even when mirroring attempted    LABS                        9.9    9.82  )-----------( 407      ( 09 Dec 2020 06:10 )             32.4     12-09    131<L>  |  90<L>  |  11  ----------------------------<  95  4.8   |  25  |  <0.5<L>    Ca    9.3      09 Dec 2020 06:10  Mg     1.9     12-09                    RADIOLOGY

## 2020-12-09 NOTE — PROGRESS NOTE ADULT - ASSESSMENT
66 y/o female with PMH Asthma, emphysema (not on home O2), anxiety/depression presents to [facility] with worsening abdominal pain. Found to have suspected metastatic cancer. Patient has chosen to return home with hospice.    # Abd pain- suspected metastatic cancer (unknown primary)  - incidental finding on CTA showing Left upper lobe lung lesion and multiple bone/liver mets?  - CXR: Multiple rounded opacities throughout the left lung, findings highly suggestive of metastatic disease. Hyperinflation with flattening of the diaphragms c/w a COPD  - CXR 12/7: slightly dec left lung opacities  - malignancy w/u:  MRI Thoracic/lumbar spine ordered, but pt has been refusing w/u since 12/3 due to anxiety and unsure if would want to undergo chemotherapy given frail state, pursuing hospice care at this time  - Hold off on IR bone biopsy for now > will not pursue  - neurosurgery following--C/w dexa 4 mg iv BID > patient will have steroid taper with prednisone on discharge  40 mg x 3 days followed by 20 mg x 3 days followed by 10 mg x 3 days  - pain control- morphine 7.5mg PO IR Q3H PRN with bowel regimen of senna 2 tabs QHS, miralax to be added PRN  - F/u heme/onc recomm if pt agrees to w/u > pt not pursuing  - Hospice and palliative following    # Asthma  # Emphysema not on home O2  - Currently on RA 98%, will titrate to goal of 88-92%  - Asmanex (home medication) not available Qvar here (intolerant to Symbicort)    # HTN  - /72  - Metoprolol 12.5mg PO BID  - Normal ECHO, normal EF    # Agoraphobia  - Xanax 025mg PO Q6H, Sertraline 125mg PO QD    # Malnutrition, SEVERE PCM   - BMI <19  - Diet per nutrition  - Started Marinol 2.5mg PO QD     DVT ppx: Lovenox   GI ppx: none   Diet: regular  Activity: IAT   Dispo: home with hospice services 12/10  DNR/DNI

## 2020-12-10 VITALS
DIASTOLIC BLOOD PRESSURE: 70 MMHG | HEART RATE: 90 BPM | TEMPERATURE: 98 F | SYSTOLIC BLOOD PRESSURE: 107 MMHG | RESPIRATION RATE: 18 BRPM

## 2020-12-10 PROCEDURE — 99231 SBSQ HOSP IP/OBS SF/LOW 25: CPT

## 2020-12-10 RX ADMIN — MORPHINE SULFATE 7.5 MILLIGRAM(S): 50 CAPSULE, EXTENDED RELEASE ORAL at 06:02

## 2020-12-10 RX ADMIN — Medication 40 MILLIGRAM(S): at 06:02

## 2020-12-10 RX ADMIN — Medication 12.5 MILLIGRAM(S): at 06:02

## 2020-12-10 RX ADMIN — Medication 0.25 MILLIGRAM(S): at 09:40

## 2020-12-10 RX ADMIN — Medication 2.5 MILLIGRAM(S): at 11:13

## 2020-12-10 RX ADMIN — ENOXAPARIN SODIUM 40 MILLIGRAM(S): 100 INJECTION SUBCUTANEOUS at 11:13

## 2020-12-10 NOTE — PROGRESS NOTE ADULT - ASSESSMENT
66 y/o female with PMH Asthma, emphysema (not on home O2), anxiety/depression presents to [facility] with worsening abdominal pain. Found to have suspected metastatic cancer. Patient has chosen to return home with hospice.    A/P   #  Metastatic cancer ( likely lung)  - pt decided not to proceed with further work up and treatment   - consulted by palliative care, agreed for end of life and hospice care  - awaiting for DME deliver to her house  - c/w current medical management pain meds as per palliative care   - will discharge pt home today with hospice services   - supportive care, fall and aspiration precautions     # L5 pathological Fx   -pt was consulted by neurosurgery   - c/w  steroid taper with prednisone 40 mg x 3 days followed by 20 mg x 3 days followed by 10 mg x 3 days  - pain control- morphine 7.5mg PO IR Q3H PRN with bowel regimen of senna 2 tabs QHS, miralax to be added PRN  - prevent falls     # Asthma  - stable on RA at rest , hospice will take over at home   - Asmanex (home medication)   -c/w  Qvar on discharge     # HTN  - DASH diet   - Metoprolol 12.5mg PO BID      # Agoraphobia  - Xanax 025mg PO Q6H, Sertraline 125mg PO QD    #Severe  Malnutrition  - BMI <19  - Diet per nutrition  - Started Marinol 2.5mg PO QD     DVT ppx: Lovenox   GI ppx: none   Diet: regular  Activity: IAT   Dispo: will discharge home today with hospice service   DNR/DNI, prognosis grave

## 2020-12-10 NOTE — CHART NOTE - NSCHARTNOTESELECT_GEN_ALL_CORE
Malnutrition Notification
Event Note
Event Note/Palliative Care SW Note
Event Note/RD follow up
Internal Medicine/Event Note
Palliative Care SW Note/Event Note
Transfer Note

## 2020-12-10 NOTE — PROGRESS NOTE ADULT - SUBJECTIVE AND OBJECTIVE BOX
66 y/o female with PMH Asthma, emphysema (not on home O2), anxiety/depression presents to [facility] with worsening abdominal pain. Patient states her pain first began about 3 months ago. It initially presented itself as a sharp intermittent pain behind the navel, but over time grew to be constant with radiation to her hips/back. During this time she has also become very weak, developed dyspnea on exertion, and lost about 30lbs unintentionally. Patient denies any fevers, chills, nausea, vomiting, palpitations, or numbness/tingling.   Pt was found to have suspected metastatic cancer. Patient has chosen to return home with hospice.  Today pt looks comfortable very weak, denies pain.     PAST MEDICAL & SURGICAL HISTORY  Depression  Agoraphobia  Anxiety  Emphysema lung  No significant past surgical history      ALLERGIES  penicillins (Unknown)    Vital Signs Last 24 Hrs  T(C): 36.7 (10 Dec 2020 05:10), Max: 36.7 (09 Dec 2020 14:02)  T(F): 98 (10 Dec 2020 05:10), Max: 98 (09 Dec 2020 14:02)  HR: 90 (10 Dec 2020 05:10) (90 - 91)  BP: 107/70 (10 Dec 2020 05:10) (99/55 - 107/70)  BP(mean): --  RR: 18 (10 Dec 2020 05:10) (18 - 18)      PHYSICAL EXAM  GEN: NAD,  cachectic with temporal muscle waisting   NECK: supple, no JVD  PULM: decreased BS at basses   CVS: Regular rate and rhythm, S1-S2, no murmurs  ABD: Soft, non-tender, non-distended, no guarding  EXT: No edema, warm  NEURO: A&Ox3, no focal deficits      LABS                      no new labs              9.9    9.82  )-----------( 407      ( 09 Dec 2020 06:10 )             32.4     12-09    131<L>  |  90<L>  |  11  ----------------------------<  95  4.8   |  25  |  <0.5<L>    Ca    9.3      09 Dec 2020 06:10  Mg     1.9     12-09    RADIOLOGY:  < from: Xray Chest 1 View- PORTABLE-Routine (Xray Chest 1 View- PORTABLE-Routine in AM.) (12.07.20 @ 06:47) >  IMPRESSION:    Slightlydecreased patchy left lung opacities.    < end of copied text >  < from: CT Head No Cont (12.03.20 @ 21:40) >  Impression:    1.3 cm lytic lesion within the posterior left parietal bone suspicious for metastatic disease.    No acute intracranial hemorrhage, significant displacing mass effect, or midline shift is noted. Please note that parenchymal metastatic disease cannot be excluded on noncontrast head CT and if there is persistent clinical concern a postcontrast MRI should be obtained.    < end of copied text >  < from: CT Abdomen and Pelvis w/ IV Cont (12.03.20 @ 21:27) >  IMPRESSION:    Multiple left upper lobe pulmonary masses with conglomerate mediastinal/hilar lymphadenopathy and numerous hepatic lesions. Findings concerning for metastatic malignancy.    Radiolucent lesion in the inferior endplate of L2 is indeterminate.    No CT evidence of acute pulmonary embolus.    Additional Findings/Recommendations After Attending Radiologist Review:  Agree no evidence of pulmonary embolism. Multiple left upper lobe lesions suspicious for malignancy. Also scattered throughout the left lung are micronodules which could represent superimposed infection or lymphangitic spread. There are multiple findings of bony metastatic disease. Left iliac bone lytic lesion with a 6.3 cm soft tissue component (series 8, image 187). This process anteriorly displaces the retroperitoneum and left kidney. Acute pathologic compression fracture deformity of the left aspect of L5 vertebral body, with mild endplate depression. Metastatic lytic lesion in L2 vertebral body, also the right L4 posterior elements. Indeterminate 1.8 cm hyperenhancing nodule in the pelvis, may be associated with the mesentery (series 8, image 270). Possible periportal lymphadenopathy. Please note the overnight ER examination is setting is not tailored for detailed oncologic evaluation/staging or full characterization of metastatic disease.    Spoke with ROBERTA WEAVER PA on 12/3/2020 10:36 PM with readback.    < end of copied text >      MEDICATIONS  (STANDING):  ALPRAZolam 0.25 milliGRAM(s) Oral every 6 hours  budesonide 160 MICROgram(s)/formoterol 4.5 MICROgram(s) Inhaler 2 Puff(s) Inhalation two times a day  chlorhexidine 4% Liquid 1 Application(s) Topical <User Schedule>  dronabinol 2.5 milliGRAM(s) Oral daily  enoxaparin Injectable 40 milliGRAM(s) SubCutaneous daily  influenza   Vaccine 0.5 milliLiter(s) IntraMuscular once  metoprolol tartrate 12.5 milliGRAM(s) Oral two times a day  predniSONE   Tablet   Oral   predniSONE   Tablet 40 milliGRAM(s) Oral daily  senna 2 Tablet(s) Oral at bedtime  sertraline 125 milliGRAM(s) Oral daily    MEDICATIONS  (PRN):  ALPRAZolam 0.25 milliGRAM(s) Oral every 12 hours PRN anxiety  morphine  IR 7.5 milliGRAM(s) Oral every 3 hours PRN Pain 1-10 as per palliative

## 2020-12-10 NOTE — PROGRESS NOTE ADULT - NUTRITIONAL ASSESSMENT
This patient has been assessed with a concern for Malnutrition and has been determined to have a diagnosis/diagnoses of Severe protein-calorie malnutrition and Underweight/BMI < 19.    This patient is being managed with:   Diet Regular-  Prosource Gelatein Plus     Qty per Day:  2  Supplement Feeding Modality:  Oral  Ensure Enlive Cans or Servings Per Day:  1       Frequency:  Two Times a day  Entered: Dec  4 2020  1:49PM    Diet Regular-  Entered: Dec  4 2020 12:38AM    The following pending diet order is being considered for treatment of Severe protein-calorie malnutrition and Underweight/BMI < 19:null

## 2020-12-10 NOTE — CHART NOTE - NSCHARTNOTEFT_GEN_A_CORE
<<<RESIDENT DISCHARGE NOTE>>>     SRINIVASA ESTRADA  MRN-[id number]    No overnight events. Patient seen and examined at bedside; resting comfortably. Stable for discharge to home with home hospice this morning. D/c order placed at 7am for intended transport pickup at 8am. Casemanagement aware.     VITAL SIGNS:  T(F): 98 (12-10-20 @ 05:10), Max: 98 (12-09-20 @ 14:02)  HR: 90 (12-10-20 @ 05:10)  BP: 107/70 (12-10-20 @ 05:10)  SpO2: --      PHYSICAL EXAMINATION:  General: cachetic, lethargic, appearing older than stated age  Head & Neck: cachexia, lethargy, bi temporal wasting, PERRLA, EOMI  Pulmonary: CTA, no w/r/r  Cardiovascular: RRR, No murmurs, rubs gallops   Gastrointestinal/Abdomen & Pelvis: soft nontender, nondistended  Neurologic/Motor: non focal    TEST RESULTS:                        9.9    9.82  )-----------( 407      ( 09 Dec 2020 06:10 )             32.4       12-09    131<L>  |  90<L>  |  11  ----------------------------<  95  4.8   |  25  |  <0.5<L>    Ca    9.3      09 Dec 2020 06:10  Mg     1.9     12-09        FINAL DISCHARGE INTERVIEW:  Resident(s) Present: Carlos Weems RN Present: Yasemin    DISCHARGE MEDICATION RECONCILIATION  reviewed with Attending Dr. Phan   DISPOSITION:   [  ] Home,    [  ] Home with Visiting Nursing Services,   [    ]  SNF/ NH,    [   ] Acute Rehab (4A),   [   ] Other (Specify:_________)